# Patient Record
Sex: MALE | Race: BLACK OR AFRICAN AMERICAN | Employment: OTHER | URBAN - METROPOLITAN AREA
[De-identification: names, ages, dates, MRNs, and addresses within clinical notes are randomized per-mention and may not be internally consistent; named-entity substitution may affect disease eponyms.]

---

## 2017-11-09 PROCEDURE — 86787 VARICELLA-ZOSTER ANTIBODY: CPT | Performed by: FAMILY MEDICINE

## 2017-11-09 PROCEDURE — 81003 URINALYSIS AUTO W/O SCOPE: CPT | Performed by: FAMILY MEDICINE

## 2017-11-09 PROCEDURE — 87389 HIV-1 AG W/HIV-1&-2 AB AG IA: CPT | Performed by: FAMILY MEDICINE

## 2017-11-09 PROCEDURE — 36415 COLL VENOUS BLD VENIPUNCTURE: CPT | Performed by: FAMILY MEDICINE

## 2018-01-10 PROCEDURE — 88305 TISSUE EXAM BY PATHOLOGIST: CPT | Performed by: INTERNAL MEDICINE

## 2019-07-16 ENCOUNTER — HOSPITAL ENCOUNTER (INPATIENT)
Facility: HOSPITAL | Age: 64
LOS: 1 days | Discharge: HOME | DRG: 310 | End: 2019-07-17
Attending: EMERGENCY MEDICINE | Admitting: INTERNAL MEDICINE
Payer: COMMERCIAL

## 2019-07-16 ENCOUNTER — APPOINTMENT (OUTPATIENT)
Dept: GENERAL RADIOLOGY | Facility: HOSPITAL | Age: 64
DRG: 310 | End: 2019-07-16
Payer: COMMERCIAL

## 2019-07-16 DIAGNOSIS — I47.2 NSVT (NONSUSTAINED VENTRICULAR TACHYCARDIA) (HCC): Primary | ICD-10-CM

## 2019-07-16 PROBLEM — I47.29 NSVT (NONSUSTAINED VENTRICULAR TACHYCARDIA) (HCC): Status: ACTIVE | Noted: 2019-07-16

## 2019-07-16 LAB
ALBUMIN SERPL-MCNC: 3.8 G/DL (ref 3.4–5)
ALBUMIN/GLOB SERPL: 0.9 {RATIO} (ref 1–2)
ALP LIVER SERPL-CCNC: 47 U/L (ref 45–117)
ALT SERPL-CCNC: 34 U/L (ref 16–61)
ANION GAP SERPL CALC-SCNC: 6 MMOL/L (ref 0–18)
AST SERPL-CCNC: 22 U/L (ref 15–37)
ATRIAL RATE: 68 BPM
BASOPHILS # BLD AUTO: 0.03 X10(3) UL (ref 0–0.2)
BASOPHILS NFR BLD AUTO: 0.6 %
BILIRUB SERPL-MCNC: 0.6 MG/DL (ref 0.1–2)
BUN BLD-MCNC: 15 MG/DL (ref 7–18)
BUN/CREAT SERPL: 11.1 (ref 10–20)
CALCIUM BLD-MCNC: 8.8 MG/DL (ref 8.5–10.1)
CHLORIDE SERPL-SCNC: 104 MMOL/L (ref 98–112)
CO2 SERPL-SCNC: 24 MMOL/L (ref 21–32)
CREAT BLD-MCNC: 1.35 MG/DL (ref 0.7–1.3)
DEPRECATED RDW RBC AUTO: 42.3 FL (ref 35.1–46.3)
EOSINOPHIL # BLD AUTO: 0.2 X10(3) UL (ref 0–0.7)
EOSINOPHIL NFR BLD AUTO: 3.8 %
ERYTHROCYTE [DISTWIDTH] IN BLOOD BY AUTOMATED COUNT: 19.2 % (ref 11–15)
GLOBULIN PLAS-MCNC: 4.1 G/DL (ref 2.8–4.4)
GLUCOSE BLD-MCNC: 97 MG/DL (ref 70–99)
HAV IGM SER QL: 2 MG/DL (ref 1.6–2.6)
HCT VFR BLD AUTO: 49.9 % (ref 39–53)
HGB BLD-MCNC: 15.8 G/DL (ref 13–17.5)
IMM GRANULOCYTES # BLD AUTO: 0.01 X10(3) UL (ref 0–1)
IMM GRANULOCYTES NFR BLD: 0.2 %
LYMPHOCYTES # BLD AUTO: 2.19 X10(3) UL (ref 1–4)
LYMPHOCYTES NFR BLD AUTO: 41.4 %
M PROTEIN MFR SERPL ELPH: 7.9 G/DL (ref 6.4–8.2)
MCH RBC QN AUTO: 22.7 PG (ref 26–34)
MCHC RBC AUTO-ENTMCNC: 31.7 G/DL (ref 31–37)
MCV RBC AUTO: 71.6 FL (ref 80–100)
MONOCYTES # BLD AUTO: 0.77 X10(3) UL (ref 0.1–1)
MONOCYTES NFR BLD AUTO: 14.6 %
NEUTROPHILS # BLD AUTO: 2.09 X10 (3) UL (ref 1.5–7.7)
NEUTROPHILS # BLD AUTO: 2.09 X10(3) UL (ref 1.5–7.7)
NEUTROPHILS NFR BLD AUTO: 39.4 %
OSMOLALITY SERPL CALC.SUM OF ELEC: 279 MOSM/KG (ref 275–295)
P AXIS: 28 DEGREES
P-R INTERVAL: 204 MS
PLATELET # BLD AUTO: 78 10(3)UL (ref 150–450)
POTASSIUM SERPL-SCNC: 3.6 MMOL/L (ref 3.5–5.1)
Q-T INTERVAL: 418 MS
QRS DURATION: 94 MS
QTC CALCULATION (BEZET): 444 MS
R AXIS: 43 DEGREES
RBC # BLD AUTO: 6.97 X10(6)UL (ref 4.3–5.7)
SODIUM SERPL-SCNC: 134 MMOL/L (ref 136–145)
T AXIS: 68 DEGREES
TROPONIN I SERPL-MCNC: <0.045 NG/ML (ref ?–0.04)
VENTRICULAR RATE: 68 BPM
WBC # BLD AUTO: 5.3 X10(3) UL (ref 4–11)

## 2019-07-16 PROCEDURE — 85025 COMPLETE CBC W/AUTO DIFF WBC: CPT

## 2019-07-16 PROCEDURE — 94660 CPAP INITIATION&MGMT: CPT

## 2019-07-16 PROCEDURE — 99285 EMERGENCY DEPT VISIT HI MDM: CPT | Performed by: EMERGENCY MEDICINE

## 2019-07-16 PROCEDURE — 84484 ASSAY OF TROPONIN QUANT: CPT

## 2019-07-16 PROCEDURE — 83735 ASSAY OF MAGNESIUM: CPT | Performed by: INTERNAL MEDICINE

## 2019-07-16 PROCEDURE — 80053 COMPREHEN METABOLIC PANEL: CPT

## 2019-07-16 PROCEDURE — 36415 COLL VENOUS BLD VENIPUNCTURE: CPT | Performed by: EMERGENCY MEDICINE

## 2019-07-16 PROCEDURE — 93010 ELECTROCARDIOGRAM REPORT: CPT | Performed by: EMERGENCY MEDICINE

## 2019-07-16 PROCEDURE — 93005 ELECTROCARDIOGRAM TRACING: CPT

## 2019-07-16 PROCEDURE — 71045 X-RAY EXAM CHEST 1 VIEW: CPT

## 2019-07-16 RX ORDER — PRAVASTATIN SODIUM 20 MG
20 TABLET ORAL NIGHTLY
Status: DISCONTINUED | OUTPATIENT
Start: 2019-07-16 | End: 2019-07-17

## 2019-07-16 RX ORDER — POTASSIUM CHLORIDE 20 MEQ/1
40 TABLET, EXTENDED RELEASE ORAL EVERY 4 HOURS
Status: COMPLETED | OUTPATIENT
Start: 2019-07-16 | End: 2019-07-16

## 2019-07-16 RX ORDER — CARVEDILOL 12.5 MG/1
25 TABLET ORAL 2 TIMES DAILY WITH MEALS
Status: DISCONTINUED | OUTPATIENT
Start: 2019-07-16 | End: 2019-07-17

## 2019-07-16 RX ORDER — HYDROCHLOROTHIAZIDE 25 MG/1
25 TABLET ORAL
Status: DISCONTINUED | OUTPATIENT
Start: 2019-07-16 | End: 2019-07-17

## 2019-07-16 RX ORDER — ACETAMINOPHEN 325 MG/1
650 TABLET ORAL EVERY 6 HOURS PRN
Status: DISCONTINUED | OUTPATIENT
Start: 2019-07-16 | End: 2019-07-17

## 2019-07-16 RX ORDER — DILTIAZEM HYDROCHLORIDE 60 MG/1
60 TABLET, FILM COATED ORAL EVERY 6 HOURS SCHEDULED
Status: DISCONTINUED | OUTPATIENT
Start: 2019-07-16 | End: 2019-07-17

## 2019-07-16 NOTE — ED NOTES
Update given to [facility] regarding the blood pressure and the runs of vtach patient is asymptomatic MD aware no change in treatment plan

## 2019-07-16 NOTE — CONSULTS
[facility] Cardiology  Consultation Note      Arabella Rey.  Patient Status:  Inpatient    1955 MRN [id number]   [facility] 2NE-A Attending Brenda Krishnan MD   Hosp Day # 0 PCP Ren Bruce MD     Reason for cons Sickle cell trait ([facility] 3/14/2007   • Thrombopenia ([facility] 12/14/2006   • Unspecified essential hypertension    • Unspecified sleep apnea     PSG at [place] 2/23/2008 AHI 66   • Vitamin D deficiency 5/10   • Vitamin D deficiency 3/14/2010       Past Surgical pulse 70, temperature 98.6 °F (37 °C), temperature source Temporal, resp. rate 17, height 6' 5\" (1.956 m), weight 299 lb 13.2 oz (136 kg), SpO2 99 %.   Temp (24hrs), Av.6 °F (37 °C), Min:98.6 °F (37 °C), Max:98.6 °F (37 °C)    Wt Readings from Last 3 E

## 2019-07-16 NOTE — ED PROVIDER NOTES
Patient Seen in: [facility] Emergency Department    History   Patient presents with:  Abnormal Result (metabolic, cardiac)    Stated Complaint: abnormal EKG     HPI    Patient is a 60-year-old male sent here from stress test after he was found to ha Alcohol/week: 0.0 oz      Frequency: 2-4 times a month      Drinks per session: 1 or 2      Binge frequency: Never      Comment: ABOUT 3-5  DRINKS/WK. DENIES BINGE since ABOUT 2000. Drug use: No      Comment: NO MARIJUANA SINCE YOUTH.       Review of S 19.2 (*)     All other components within normal limits   TROPONIN I - Normal   CBC WITH DIFFERENTIAL WITH PLATELET    Narrative: The following orders were created for panel order CBC WITH DIFFERENTIAL WITH PLATELET.   Procedure

## 2019-07-16 NOTE — ED INITIAL ASSESSMENT (HPI)
Pt here after EKG at clinic showed runs of vtach. Patient denies any chest pain or JAMEY. States he was receiving a physical exam and was to have echo stress test completed today. Reports only abnormal symptoms is a few dizzy spells last month.  Denies any hi

## 2019-07-17 ENCOUNTER — APPOINTMENT (OUTPATIENT)
Dept: CT IMAGING | Facility: HOSPITAL | Age: 64
DRG: 310 | End: 2019-07-17
Attending: INTERNAL MEDICINE
Payer: COMMERCIAL

## 2019-07-17 ENCOUNTER — APPOINTMENT (OUTPATIENT)
Dept: CV DIAGNOSTICS | Facility: HOSPITAL | Age: 64
DRG: 310 | End: 2019-07-17
Attending: INTERNAL MEDICINE
Payer: COMMERCIAL

## 2019-07-17 VITALS
SYSTOLIC BLOOD PRESSURE: 136 MMHG | HEART RATE: 55 BPM | BODY MASS INDEX: 35.4 KG/M2 | WEIGHT: 299.81 LBS | HEIGHT: 77 IN | RESPIRATION RATE: 20 BRPM | OXYGEN SATURATION: 93 % | TEMPERATURE: 98 F | DIASTOLIC BLOOD PRESSURE: 75 MMHG

## 2019-07-17 LAB
POTASSIUM SERPL-SCNC: 3.5 MMOL/L (ref 3.5–5.1)
TSI SER-ACNC: 1.72 MIU/ML (ref 0.36–3.74)

## 2019-07-17 PROCEDURE — 93306 TTE W/DOPPLER COMPLETE: CPT | Performed by: INTERNAL MEDICINE

## 2019-07-17 PROCEDURE — 84443 ASSAY THYROID STIM HORMONE: CPT | Performed by: HOSPITALIST

## 2019-07-17 PROCEDURE — 75574 CT ANGIO HRT W/3D IMAGE: CPT | Performed by: INTERNAL MEDICINE

## 2019-07-17 PROCEDURE — 84132 ASSAY OF SERUM POTASSIUM: CPT | Performed by: HOSPITALIST

## 2019-07-17 RX ORDER — NITROGLYCERIN 0.4 MG/1
TABLET SUBLINGUAL
Status: COMPLETED
Start: 2019-07-17 | End: 2019-07-17

## 2019-07-17 RX ORDER — NITROGLYCERIN 0.4 MG/1
0.4 TABLET SUBLINGUAL ONCE
Status: COMPLETED | OUTPATIENT
Start: 2019-07-17 | End: 2019-07-17

## 2019-07-17 RX ORDER — DILTIAZEM HYDROCHLORIDE 120 MG/1
120 CAPSULE, COATED, EXTENDED RELEASE ORAL DAILY
Qty: 30 CAPSULE | Refills: 1 | Status: SHIPPED | OUTPATIENT
Start: 2019-07-17 | End: 2019-09-03

## 2019-07-17 RX ORDER — POTASSIUM CHLORIDE 20 MEQ/1
40 TABLET, EXTENDED RELEASE ORAL EVERY 4 HOURS
Status: COMPLETED | OUTPATIENT
Start: 2019-07-17 | End: 2019-07-17

## 2019-07-17 RX ORDER — DILTIAZEM HYDROCHLORIDE 120 MG/1
120 CAPSULE, EXTENDED RELEASE ORAL DAILY
Status: DISCONTINUED | OUTPATIENT
Start: 2019-07-17 | End: 2019-07-17

## 2019-07-17 NOTE — PROGRESS NOTES
[facility] [address] [place] [address] Cardiology  Progress Note    Massey Marshall.  Patient Status:  Inpatient    1955 MRN [id number]   [facility] 2NE-A Attending Sarah Oakley MD   Hosp Day # 1 PCP Nicki Garcia MD     ADDENDUM:  The gerardo kg)      General: Awake and alert; in no acute distress  Cardiac: Regular rate and regular rhythm; no murmurs/rubs/gallops are appreciated  Lungs: Clear to auscultation bilaterally; no accessory muscle use  Abdomen: Soft, non-tender; bowel sounds are normo

## 2019-07-17 NOTE — PLAN OF CARE
Problem: Patient/Family Goals  Goal: Patient/Family Long Term Goal  Description  Patient's Long Term Goal: discharge    Interventions:    - See additional Care Plan goals for specific interventions  7/17/2019 1651 by Flakita Ray RN  Outcome: Antonio Olivares

## 2019-07-17 NOTE — PLAN OF CARE
Patient pleasant a/o.vss. Instructed patient on testing to be performed today. 18 g IV needed and placed to perform CTA corns this AM. All AM meds given this am and tolerating well. K replaced this am as well.  Patient denies sob and chest pain and is not

## 2019-07-17 NOTE — PLAN OF CARE
7/17/2019    Pt A&Ox4, RA w/ cpap use at night. NSR w/ frequent pvc's-  aware. Continent of B and B. Up adlib. Denies any pain or sob.  Pt asleep at this time    POC: NPO, labs in am, Coronary cta w/ no protocol, Echo        Problem: CARDIOVASCULAR - AD

## 2019-07-17 NOTE — RESPIRATORY THERAPY NOTE
AYDEE : EQUIPMENT USE: DAILY SUMMARY                                            SET MODE: AUTO CPAP WITH CFLEX                                          USAGE IN HOURS:7:24                                          90%

## 2019-07-17 NOTE — CONSULTS
[facility] Electrophysiology Consult      Jax Huddleston.  Patient Status:  Inpatient    1955 MRN [id number]   [facility] 2NE-A Attending Marthenia Goodell, MD   [facility] Day # 1 PCP MD Jax Yang BY MOUTH  DAILY Disp: 90 tablet Rfl: 0   CARVEDILOL 25 MG Oral Tab TAKE 1 TABLET BY MOUTH  TWICE A DAY WITH MEALS Disp: 180 tablet Rfl: 0   Pravastatin Sodium 20 MG Oral Tab TAKE 1 TABLET BY MOUTH ONCE EVERY EVENING Disp: 90 tablet Rfl: 1   TADALAFIL 20 MG thyromegaly  RESPIRATORY: clear to auscultation  CARDIOVASCULAR: S1, S2 normal, RRR; no S3, no S4; no click; no murmur  ABDOMEN: normal active BS, soft, nondistended; nontender  EXTREMITIES: no cyanosis, clubbing or edema, peripheral pulses intact  NEURO:

## 2019-07-17 NOTE — PAYOR COMM NOTE
--------------  ADMISSION REVIEW     Payor: [address] [employer]  Subscriber #:  [id number]  Authorization Number: Pend    Admit date: 7/16/19  Admit time: 1643       Admitting Physician: Torri Zayas MD  Attending Physician:  Feliciano Padilla otherwise normal sinus rhythm without ST changes T wave inversions. EKG from stress test reviewed, several runs of NSVT. MDM       Per Dr. Viridiana Michelle from [facility] cardiology:   Admit to telemetry. Ischemic work-up tomorrow, possibly get a CTA.   EP t CONSULT    He went to see his PCP and told him things looked good but that he hsould get the stress test that had been ordered earlier this year.  It seems to have been ordered as a screening test.     He came for the stress test and was noted to have very

## 2019-07-17 NOTE — IMAGING NOTE
Spoke with [facility] RN instructed he can eat breakfast, drink plenty of water, hold caffeine (including decaf & chocolate) x 12 hours, take all meds (including beta blockers), pt will need an 18g in [facility] for IV access for the CTA Gated Coronary study, s

## 2019-07-18 NOTE — DISCHARGE SUMMARY
General Medicine Discharge Summary     Patient ID:  Aparna Lopez.  59year old  4/21/1955    Admit date: 7/16/2019    Discharge date and time:  7/17/19    Attending Physician: Mireya att. prem zacarias Procedures:        Patient instructions:      Discharge Medication List as of 7/17/2019  5:01 PM    START taking these medications    dilTIAZem HCl ER Coated Beads 120 MG Oral Capsule SR 24 Hr  Take 1 capsule (120 mg total) by mouth daily. , Normal, Disp-30

## 2019-07-25 NOTE — PAYOR COMM NOTE
--------------  DISCHARGE REVIEW    Payor: [facility]  Subscriber #:  [id number]  Authorization Number: Pend    Admit date: 7/16/19  Admit time:  9742  Discharge Date: 7/17/2019  6:12 PM     Admitting Physician: Roger Hurt MD  [facility]

## 2020-06-19 PROBLEM — I42.9 CARDIOMYOPATHY (HCC): Status: ACTIVE | Noted: 2020-06-19

## 2020-12-04 VITALS — BODY MASS INDEX: 34.24 KG/M2 | HEIGHT: 77 IN | WEIGHT: 290 LBS

## 2020-12-06 ENCOUNTER — LAB ENCOUNTER (OUTPATIENT)
Dept: LAB | Facility: HOSPITAL | Age: 65
End: 2020-12-06
Attending: INTERNAL MEDICINE
Payer: MEDICARE

## 2020-12-06 DIAGNOSIS — I48.91 A-FIB (HCC): ICD-10-CM

## 2020-12-08 ENCOUNTER — HOSPITAL ENCOUNTER (OUTPATIENT)
Dept: INTERVENTIONAL RADIOLOGY/VASCULAR | Facility: HOSPITAL | Age: 65
Discharge: HOME | End: 2020-12-08
Attending: INTERNAL MEDICINE | Admitting: INTERNAL MEDICINE
Payer: MEDICARE

## 2020-12-08 DIAGNOSIS — I48.91 A-FIB (HCC): Primary | ICD-10-CM

## 2020-12-08 DIAGNOSIS — I48.91 ATRIAL FIBRILLATION, UNSPECIFIED TYPE (HCC): ICD-10-CM

## 2020-12-08 PROCEDURE — 94660 CPAP INITIATION&MGMT: CPT

## 2020-12-08 RX ORDER — SODIUM CHLORIDE 9 MG/ML
INJECTION, SOLUTION INTRAVENOUS CONTINUOUS
Status: DISCONTINUED | OUTPATIENT
Start: 2020-12-08 | End: 2020-12-09

## 2020-12-08 NOTE — PROGRESS NOTES
Per Dr. Verlena Apley, DCCV cancelled d/t patients HR. PT verbalized understanding. PT DC home with son.

## 2020-12-08 NOTE — H&P
72year old male     He was at THE [facility] 7/2019:  1m ago he had 2 episodes of transient lightheadedness lasting 10 seconds when he got out of his car.   He went to see his PCP and told him things looked good but that he should get the stress test that had been 180 tablet, Rfl: 3  •  dilTIAZem HCl ER Coated Beads 120 MG Oral Capsule SR 24 Hr, Take 1 capsule (120 mg total) by mouth 2 (two) times daily. , Disp: 180 capsule, Rfl: 2     Physical:  /88   Pulse 59   Ht 6' 5\" (1.956 m)   Wt 294 lb (133.4 kg)   BMI

## 2020-12-15 ENCOUNTER — LAB ENCOUNTER (OUTPATIENT)
Dept: LAB | Facility: HOSPITAL | Age: 65
End: 2020-12-15
Attending: INTERNAL MEDICINE
Payer: MEDICARE

## 2020-12-15 DIAGNOSIS — I48.91 A-FIB (HCC): ICD-10-CM

## 2020-12-17 ENCOUNTER — TELEPHONE (OUTPATIENT)
Dept: HEMATOLOGY/ONCOLOGY | Facility: HOSPITAL | Age: 65
End: 2020-12-17

## 2020-12-17 NOTE — TELEPHONE ENCOUNTER
Alejandro called to schedule a consult with  referred by  for low platelets. He is requesting [place]. Is there a sooner availability I could offer?

## 2020-12-18 ENCOUNTER — HOSPITAL ENCOUNTER (OUTPATIENT)
Dept: INTERVENTIONAL RADIOLOGY/VASCULAR | Facility: HOSPITAL | Age: 65
Discharge: HOME | End: 2020-12-18
Attending: INTERNAL MEDICINE | Admitting: INTERNAL MEDICINE
Payer: MEDICARE

## 2020-12-18 VITALS
WEIGHT: 290 LBS | HEIGHT: 77 IN | OXYGEN SATURATION: 100 % | HEART RATE: 57 BPM | RESPIRATION RATE: 18 BRPM | SYSTOLIC BLOOD PRESSURE: 149 MMHG | BODY MASS INDEX: 34.24 KG/M2 | DIASTOLIC BLOOD PRESSURE: 88 MMHG

## 2020-12-18 DIAGNOSIS — I48.91 A-FIB (HCC): Primary | ICD-10-CM

## 2020-12-18 DIAGNOSIS — I48.91 ATRIAL FIBRILLATION, UNSPECIFIED TYPE (HCC): ICD-10-CM

## 2020-12-18 RX ORDER — SODIUM CHLORIDE 9 MG/ML
INJECTION, SOLUTION INTRAVENOUS CONTINUOUS
Status: DISCONTINUED | OUTPATIENT
Start: 2020-12-18 | End: 2020-12-18

## 2020-12-18 NOTE — H&P
72year old male     He was at THE [facility] 7/2019:  1m ago he had 2 episodes of transient lightheadedness lasting 10 seconds when he got out of his car.   He went to see his PCP and told him things looked good but that he should get the stress test that had been 120 MG Oral Capsule SR 24 Hr, Take 1 capsule (120 mg total) by mouth 2 (two) times daily. , Disp: 180 capsule, Rfl: 2     Physical:  /88   Pulse 59   Ht 6' 5\" (1.956 m)   Wt 294 lb (133.4 kg)   BMI 34.86 kg/m²   GENERAL: well developed, well nourishe

## 2020-12-18 NOTE — PROGRESS NOTES
dR Rodriguez AT BEDSIDE REVIEWING PATIENTS EKG. nOT IN AFIB, NO CARDIOVERSION NEED AT THIS TIME PER DR RODRIGUEZ. OK TO DISCHARGE HOME.  INSTRUCTIONS TO TO  HOME MOITOR IN OFF THIS WEEK, SEE DR RODRIGUEZ IN 1 MONTH, AND NO CHANGE IN MEDICATIONS

## 2020-12-24 PROBLEM — I48.91 ATRIAL FIBRILLATION (HCC): Status: ACTIVE | Noted: 2020-12-24

## 2020-12-24 PROBLEM — D68.69 SECONDARY HYPERCOAGULABLE STATE (HCC): Status: ACTIVE | Noted: 2020-12-24

## 2021-01-04 ENCOUNTER — OFFICE VISIT (OUTPATIENT)
Dept: HEMATOLOGY/ONCOLOGY | Facility: HOSPITAL | Age: 66
End: 2021-01-04
Attending: INTERNAL MEDICINE
Payer: MEDICARE

## 2021-01-04 VITALS
RESPIRATION RATE: 16 BRPM | OXYGEN SATURATION: 96 % | TEMPERATURE: 97 F | SYSTOLIC BLOOD PRESSURE: 154 MMHG | HEIGHT: 77.01 IN | DIASTOLIC BLOOD PRESSURE: 90 MMHG | BODY MASS INDEX: 36.07 KG/M2 | HEART RATE: 60 BPM | WEIGHT: 305.5 LBS

## 2021-01-04 DIAGNOSIS — D58.2 OTHER HEMOGLOBINOPATHIES (HCC): ICD-10-CM

## 2021-01-04 DIAGNOSIS — D69.6 THROMBOCYTOPENIA (HCC): Primary | ICD-10-CM

## 2021-01-04 PROCEDURE — 99205 OFFICE O/P NEW HI 60 MIN: CPT | Performed by: INTERNAL MEDICINE

## 2021-01-04 NOTE — PATIENT INSTRUCTIONS
Your platelet count is mildly low, and has been that way since at least 2007.  This is likely a normal value for you, as the rest of your blood cells are normal. It is possible that it represents chronic, mild, immune suppression of your platelet count, but

## 2021-01-04 NOTE — CONSULTS
[facility] Center Report of Consultation    Patient Name: Mei Gamez. YOB: 1955   Medical Record Number: [id number]   CSN: [id number]   Consulting Physician: iNno Palmer M.D. Referring Physician: No ref.  provider found diverticulosis, multiple polyps (7 adenomas from 3-10 mm in ascending and transverse colon). repeat 3 yrs   • COLONOSCOPY     • OTHER SURGICAL HISTORY      DENTAL   • OTHER SURGICAL HISTORY      EXPLORATORY LAP.  AT [address] [place] [address] Drive Sexual activity: Not Currently        Partners: Female    Lifestyle      Physical activity        Days per week: Not on file        Minutes per session: Not on file      Stress: Not on file    Relationships      Social connections        Talks on phone: No (25 mg total) by mouth 2 (two) times daily. , Disp: 180 tablet, Rfl: 3    Allergies:  No Known Allergies     Review of Systems:    Constitutional Normal - No fevers, chills, night sweats, excessive fatigue or weight loss.    Eyes Normal - No significant visu cervical, supraclavicular, axillary or inguinal area. Respiratory Normal - Lungs are clear to auscultation without rhonchi or wheezing. Cardiovascular Normal - Regular rate and rhythm of heart without murmurs, gallops or rubs.    Abdomen Normal - Non-te blood counts. It likely accounts for his mild microcytosis. We discussed the genetic nature of this finding.  I recommend testing of his children, as this can be passed on to future generations, and if a child inherits this finding, along with another hemog

## 2021-02-02 DIAGNOSIS — Z23 NEED FOR VACCINATION: ICD-10-CM

## 2021-02-05 ENCOUNTER — IMMUNIZATION (OUTPATIENT)
Dept: LAB | Age: 66
End: 2021-02-05
Attending: HOSPITALIST
Payer: MEDICARE

## 2021-02-05 DIAGNOSIS — Z23 NEED FOR VACCINATION: Primary | ICD-10-CM

## 2021-02-05 PROCEDURE — 0001A SARSCOV2 VAC 30MCG/0.3ML IM: CPT

## 2021-02-26 ENCOUNTER — IMMUNIZATION (OUTPATIENT)
Dept: LAB | Age: 66
End: 2021-02-26
Attending: HOSPITALIST
Payer: MEDICARE

## 2021-02-26 DIAGNOSIS — Z23 NEED FOR VACCINATION: Primary | ICD-10-CM

## 2021-02-26 PROCEDURE — 0002A SARSCOV2 VAC 30MCG/0.3ML IM: CPT

## 2021-05-17 PROBLEM — I48.19 PERSISTENT ATRIAL FIBRILLATION (HCC): Status: ACTIVE | Noted: 2021-05-17

## 2021-07-15 PROBLEM — N52.9 ERECTILE DYSFUNCTION, UNSPECIFIED ERECTILE DYSFUNCTION TYPE: Status: ACTIVE | Noted: 2021-07-15

## 2022-05-25 ENCOUNTER — EMPLOYEE HEALTH (OUTPATIENT)
Dept: OTHER | Facility: HOSPITAL | Age: 67
End: 2022-05-25
Attending: PREVENTIVE MEDICINE

## 2022-05-25 DIAGNOSIS — Z00.00 WELLNESS EXAMINATION: Primary | ICD-10-CM

## 2022-05-25 LAB
RUBV IGG SER QL: POSITIVE
RUBV IGG SER-ACNC: 88.4 IU/ML (ref 10–?)

## 2022-05-25 PROCEDURE — 86480 TB TEST CELL IMMUN MEASURE: CPT

## 2022-05-25 PROCEDURE — 86765 RUBEOLA ANTIBODY: CPT

## 2022-05-25 PROCEDURE — 86787 VARICELLA-ZOSTER ANTIBODY: CPT

## 2022-05-25 PROCEDURE — 86735 MUMPS ANTIBODY: CPT

## 2022-05-26 LAB
M TB IFN-G CD4+ T-CELLS BLD-ACNC: 0.14 IU/ML
M TB TUBERC IFN-G BLD QL: NEGATIVE
M TB TUBERC IGNF/MITOGEN IGNF CONTROL: >10 IU/ML
QFT TB1 AG MINUS NIL: -0.06 IU/ML
QFT TB2 AG MINUS NIL: -0.07 IU/ML

## 2022-05-27 LAB
MEV IGG SER-ACNC: >300 AU/ML (ref 16.5–?)
MUV IGG SER IA-ACNC: 146 AU/ML (ref 11–?)
VZV IGG SER IA-ACNC: 1470 (ref 165–?)

## 2025-03-10 RX ORDER — RUTIN/HESP/BIOFLAV/C/HERBAL196 40-25-50MG
1 TABLET ORAL DAILY
COMMUNITY

## 2025-03-24 ENCOUNTER — LABORATORY ENCOUNTER (OUTPATIENT)
Dept: LAB | Facility: HOSPITAL | Age: 70
End: 2025-03-24
Attending: ORTHOPAEDIC SURGERY
Payer: MEDICARE

## 2025-03-24 DIAGNOSIS — Z01.818 PRE-OP TESTING: ICD-10-CM

## 2025-03-24 LAB
ANION GAP SERPL CALC-SCNC: 8 MMOL/L (ref 0–18)
ANTIBODY SCREEN: NEGATIVE
ATRIAL RATE: 53 BPM
BUN BLD-MCNC: 14 MG/DL (ref 9–23)
CALCIUM BLD-MCNC: 9.5 MG/DL (ref 8.7–10.6)
CHLORIDE SERPL-SCNC: 100 MMOL/L (ref 98–112)
CO2 SERPL-SCNC: 31 MMOL/L (ref 21–32)
CREAT BLD-MCNC: 1.21 MG/DL
EGFRCR SERPLBLD CKD-EPI 2021: 65 ML/MIN/1.73M2 (ref 60–?)
ERYTHROCYTE [DISTWIDTH] IN BLOOD BY AUTOMATED COUNT: 17.7 %
FASTING STATUS PATIENT QL REPORTED: YES
GLUCOSE BLD-MCNC: 100 MG/DL (ref 70–99)
HCT VFR BLD AUTO: 50.7 %
HGB BLD-MCNC: 16 G/DL
MCH RBC QN AUTO: 22.4 PG (ref 26–34)
MCHC RBC AUTO-ENTMCNC: 31.6 G/DL (ref 31–37)
MCV RBC AUTO: 70.9 FL
OSMOLALITY SERPL CALC.SUM OF ELEC: 289 MOSM/KG (ref 275–295)
PLATELET # BLD AUTO: 84 10(3)UL (ref 150–450)
PLATELETS.RETICULATED NFR BLD AUTO: 21.2 % (ref 0–7)
POTASSIUM SERPL-SCNC: 3.8 MMOL/L (ref 3.5–5.1)
Q-T INTERVAL: 476 MS
QRS DURATION: 102 MS
QTC CALCULATION (BEZET): 451 MS
R AXIS: 60 DEGREES
RBC # BLD AUTO: 7.15 X10(6)UL
RH BLOOD TYPE: POSITIVE
SODIUM SERPL-SCNC: 139 MMOL/L (ref 136–145)
T AXIS: 31 DEGREES
VENTRICULAR RATE: 54 BPM
WBC # BLD AUTO: 5.3 X10(3) UL (ref 4–11)

## 2025-03-24 PROCEDURE — 86850 RBC ANTIBODY SCREEN: CPT

## 2025-03-24 PROCEDURE — 93005 ELECTROCARDIOGRAM TRACING: CPT

## 2025-03-24 PROCEDURE — 36415 COLL VENOUS BLD VENIPUNCTURE: CPT

## 2025-03-24 PROCEDURE — 87081 CULTURE SCREEN ONLY: CPT

## 2025-03-24 PROCEDURE — 86901 BLOOD TYPING SEROLOGIC RH(D): CPT

## 2025-03-24 PROCEDURE — 93010 ELECTROCARDIOGRAM REPORT: CPT | Performed by: INTERNAL MEDICINE

## 2025-03-24 PROCEDURE — 86900 BLOOD TYPING SEROLOGIC ABO: CPT

## 2025-03-24 PROCEDURE — 85027 COMPLETE CBC AUTOMATED: CPT

## 2025-03-24 PROCEDURE — 80048 BASIC METABOLIC PNL TOTAL CA: CPT

## 2025-03-25 NOTE — H&P (VIEW-ONLY)
Patient ID: Russ Wheeler Jr.     Chief Complaint:    Left knee pain    HPI:  Russ Wheeler Jr. is a 69 year old male who presents today for evaluation of their left knee pain. Previously seen by Dr. Kessler who performed cortisone injection X3. Last cortisone injection (12/11/24) only provided 1-2 weeks of relief. Patient states pain started >2 yrs ago. Pain is on and off and described as sharp, is worse with weight bearing. Pain is located in the medial portion. Factors that aggravate symptoms include weight bearing, running, walking. Patient denies numbness, tingling, or radicular symptoms. Pain has continued and here to discuss surgical management.     Past medical history is pertinent for the following:  Negative history of blood clots ( DVT, PE, Stroke), Positive history of sleep apnea (CPAP compliant), Negative history of dental infection or joint infection, Negative history of UTI.      Social History    Socioeconomic History      Marital status:       Spouse name: WIDOWERED (IRMA SHANTAL      Number of children: 3      Years of education: MASTERS      Highest education level: Not on file    Occupational History      Occupation: RETIRED      Occupation: VOLUNTEER    Tobacco Use      Smoking status: Never      Smokeless tobacco: Never    Vaping Use      Vaping status: Never Used    Substance and Sexual Activity      Alcohol use: Yes        Alcohol/week: 0.0 standard drinks of alcohol        Comment: ABOUT 3-5  DRINKS/WK. DENIES BINGE since ABOUT 2000.       Drug use: No        Comment: NO MARIJUANA SINCE YOUTH.      Sexual activity: Not Currently        Partners: Female    Other Topics      Concerns:         Service: No        Blood Transfusions: No        Caffeine Concern: No          2-3 CUPS COFFEE/D         Occupational Exposure: Not Asked        Hobby Hazards: Not Asked        Sleep Concern: Not Asked        Stress Concern: Not Asked        Weight Concern: Yes        Special Diet:  Yes        Back Care: Not Asked        Exercise: Yes          MILD        Bike Helmet: Yes        Seat Belt: Yes        Self-Exams: Not Asked    Social History Narrative      [facility]    Social Determinants of Health  Financial Resource Strain: Not on file  Food Insecurity: No Food Insecurity (11/26/2018)      Hunger Vital Sign          Worried About Running Out of Food in the Last Year: Never true          Ran Out of Food in the Last Year: Never true  Transportation Needs: No Transportation Needs (11/26/2018)      PRAPARE - Transportation          Lack of Transportation (Medical): No          Lack of Transportation (Non-Medical): No  Physical Activity: Not on file  Stress: Not on file  Social Connections: Not on file  Intimate Partner Violence: Not on file  Housing Stability: Not on file  Past Medical History:   Diagnosis Date   • Arrhythmia    • Diverticulosis of large intestine     2006   • High blood pressure    • High cholesterol    • HTN, goal below 140/90 12/14/2006   • Hyperlipidemia LDL goal < 130 12/14/2006   • Obesity (BMI 30-39.9) 12/14/2006   • Obesity, unspecified    • Other and unspecified hyperlipidemia    • Paroxysmal atrial fibrillation (HCC)    • Thrombopenia (HCC) 12/14/2006   • Unspecified essential hypertension    • Unspecified sleep apnea     PSG at [place] 2/23/2008 AHI 66   • Vitamin D deficiency 05/2010   • Vitamin D deficiency 03/14/2010     Family History   Problem Relation Age of Onset   • Heart Disorder Father         CAD AT 69   • Diabetes Father    • Psychiatric Father 80        1)FORGETFUL  2)GRIEF REACTION AFTER WIFE'S DEATH   • Neurological Disorder Father         MILD PARKINSON'S   • Other (Other) Father    • Hypertension Mother    • Other (Other) Mother         OSTEOPORSIS   • Hypertension Son    • Alcohol and Other Disorders Associated Son         1) ETOHIC 2) NARCOTIC ADDICT   • Obesity Son    • Stroke Maternal Grandmother    • Other (Other)  Maternal Grandmother         CVA 70'S   • Obesity Son    • Hypertension Son         ?   • Obesity Sister    • Other (AYDEE ON CPAP) Sister    • Cancer Other         NONE       ROS:      All other pertinent positives and negatives as noted above in HPI.     Physical Exam:     Vital Signs:  There were no vitals taken for this visit.  Estimated body mass index is 33.8 kg/m² as calculated from the following:    Height as of 3/24/25: 6' 5\" (1.956 m).    Weight as of 3/24/25: 285 lb (129.3 kg).      Musculoskeletal:    Left Knee:  Painful gait with a subtle limp  No muscle atrophy, erythema, ecchymosis, or gross deformity noted  Mild knee effusion  + medial>lateral joint line tenderness  Active range of motion 0-120  5/5 strength flexion and extension  The knee is stable to varus and valgus stress testing  Mild varus alignment of the limb  Lachman negative  Posterior drawer negative  Lee's negative  Patellofemoral grind +  Sensation grossly intact to light tough throughout the lower extremity  Skin is intact  Distal pulses are 2+  No signs or symptoms of DVT    Exam of the contralateral knee is normal:  No muscle atrophy, erythema, ecchymosis, or gross deformity noted  No knee effusion  No medial or lateral joint line tenderness  Full active range of motion  5/5 strength flexion and extension  The knee is stable to varus and valgus stress testing  Lachman negative  Posterior drawer negative  Lee's negative  Patellofemoral grind negative  Sensation grossly intact to light tough throughout the lower extremity  Skin is intact  Distal pulses are intact  No signs or symptoms of DVT    Bilateral Hip exam:  No atrophy, erythema, ecchymosis, or gross deformity noted  No tenderness to palpation  Slightly dimished active range of motion, mild lack of IR but nonpainful  5/5 strength in hip flexion, abduction, and adduction  Anterior, lateral, and posterior hip impingement tests negative  Stinchfield and straight leg raise  negative  DARLINE negative    Diagnostic Studies:     Imaging was personally and individually reviewed and discussed at length with the patient:    4V left knee(s) were reviewed in the office today, including AP, flexion PA, lateral, and sunrise views:  Weight bearing views show significant degenerative changes in all three compartments with the medial compartment being most affected.  There is osteophyte formation throughout all three compartments.  There is end-stage osteoarthritis seen with bone-on-bone contact appreciated.  There is no evidence of fracture or dislocation.  No periosteal reactions or medullary lesions are seen. Patellar height and alignment are within normal limits.     Thalassemia, thrombopenia  Hgb: 16.0  Plt: 84  Alb: 3.8 (07/16/2019)  Cr: 1.21  GFR: 65  MSSA/MRSA: Negative      Assessment:     Left Knee Osteoarthritis (severe) varus        Plan:     Based on x-rays, history, and office evaluation, we have diagnosed Russ Wheeler Jr. with left knee arthritis. At this time, they have failed a conservative treatment program, medications, cortisone injections, physical therapy are no longer working.  Their symptoms are no longer relieved and have become more severe enough to significantly deteriorate their quality of life and affect their activities of daily living.  Their disability is significant enough to drastically alter their lifestyle.  Given the patient's symptoms and request, a left total knee arthroplasty is indicated.       The spectrum of treatment options were discussed with the patient in detail including both the nonoperative and operative treatment modalities and their respective risks and benefits.  After thorough discussion, the patient has elected to undergo surgical treatment.  The details of the surgical procedure were explained including the location of probable incisions and a description of the likely implants to be used.  Models and diagrams were used as educational  resources. The patient understands the likely convalescence after surgery, as well as the rehabilitation required.  We thoroughly discussed the risks, benefits, and alternatives to surgery.  The risks include but are not limited to the risk of infection, joint stiffness, blood clots (including DVT and/or pulmonary embolus along with the risk of death), neurologic and/or vascular injury, fracture, dislocation, nonunion, malunion, need for further surgery including hardware failure requiring revision, and continued pain.  It was explained that if tissue has been repaired or reconstructed, there is also a chance of failure which may require further management.  In addition, we have discussed the risks, including the risk of disease transmission, associated with any allograft tissue which may be utilized.  Following the completion of the discussion, the patient expressed understanding of this planned course of care, all their questions were answered and consent will be obtained preoperatively.    Left TKA @ [facility] (Obs)    No smoking  No diabetes - Last A1c value was  % done  .  BMI - 33  Yes AYDEE - CPAP Compliant  No hx of infections/MRSA/dental/joint  No hx of blood clots   yes blood thinner - Eliquis  Last injection - 12/11/2024  No BPH symptoms  No lumbar surgery  No allergy  Albumin - 3.8   Yes Cardiac history - A. Fib, CHF  **thrombopenia, thalassemia  No Pulmonary history       - risks, benefits and alternatives discussed  - labs reviewed and meds given  including ppx abx  - proceed with left total knee arthroplasty             Diagnoses and all orders for this visit:    Primary osteoarthritis of left knee

## 2025-03-27 ENCOUNTER — ANESTHESIA EVENT (OUTPATIENT)
Dept: SURGERY | Facility: HOSPITAL | Age: 70
End: 2025-03-27
Payer: MEDICARE

## 2025-03-30 NOTE — ANESTHESIA PREPROCEDURE EVALUATION
PRE-OP EVALUATION    Patient Name: Russ Wheeler Jr.    Admit Diagnosis: PRIMARY OSTEOATHRITIS LEFT KNEE    Pre-op Diagnosis: PRIMARY OSTEOATHRITIS LEFT KNEE    LEFT TOTAL KNEE ARTHROPLASTY    Anesthesia Procedure: LEFT TOTAL KNEE ARTHROPLASTY (Left)    Surgeons and Role:     * Bora Rodriguez MD - Primary    Pre-op vitals reviewed.  Temp: 97.8 °F (36.6 °C)  Pulse: 50  Resp: 18  BP: 155/97  SpO2: 99 %  Body mass index is 34.86 kg/m².    Current medications reviewed.  Hospital Medications:   acetaminophen (Tylenol Extra Strength) tab 1,000 mg  1,000 mg Oral Once    lactated ringers infusion   Intravenous Continuous    tranexamic acid in sodium chloride 0.7% (Cyklokapron) 1000 mg/100mL infusion premix 1,000 mg  1,000 mg Intravenous Once    ceFAZolin (Ancef) 3 g in sodium chloride 0.9% 100mL IVPB premix  3 g Intravenous Once    tranexamic acid in sodium chloride 0.7% (Cyklokapron) 1000 mg/100mL infusion premix 1,000 mg  1,000 mg Intravenous Once    povidone-iodine (Betadine) 10 % 17.5 mL in sodium chloride 0.9 % 500 mL irrigation   Irrigation Once (Intra-Op)    clonidine/epinephrine/ropivacaine/ketorolac in 0.9% NaCl 60 mL pain cocktail syringe for knee arthroplasty   Infiltration Once (Intra-Op)       Outpatient Medications:   Prescriptions Prior to Admission[1]    Allergies: Patient has no known allergies.      Anesthesia Evaluation        Anesthetic Complications           GI/Hepatic/Renal             (+) chronic renal disease and CRI                   Cardiovascular                (+) obesity  (+) hypertension                  (+) dysrhythmias (persistent AF on flecainide, BB. 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 repeat 3 years    Colonoscopy  01/2018    diverticulosis, multiple polyps (7 adenomas from 3-10 mm in ascending and transverse colon). repeat 3 yrs    Colonoscopy      Other surgical history      DENTAL    Other surgical history      EXPLORATORY LAP. AT 6 Y/O.    Other surgical history  1975    L LEG SKIN GRAFT     Social History     Socioeconomic History    Marital status:      Spouse name: WIDOWERED (SHANTAL SOLIS    Number of children: 3    Years of education: MASTERS   Occupational History    Occupation: RETIRED    Occupation: VOLUNTEER   Tobacco Use    Smoking status: Never    Smokeless tobacco: Never   Vaping Use    Vaping status: Never Used   Substance and Sexual Activity    Alcohol use: Yes     Alcohol/week: 0.0 standard drinks of alcohol     Comment: ABOUT 3-5  DRINKS/WK. DENIES BINGE since ABOUT 2000.     Drug use: No     Comment: NO MARIJUANA SINCE YOUTH.    Sexual activity: Not Currently     Partners: Female   Other Topics Concern     Service No    Blood Transfusions No    Caffeine Concern No     Comment: 2-3 CUPS COFFEE/D     Weight Concern Yes    Special Diet Yes    Exercise Yes     Comment: MILD    Bike Helmet Yes    Seat Belt Yes     History   Drug Use No     Comment: NO MARIJUANA SINCE YOUTH.     Available pre-op labs reviewed.  Lab Results   Component Value Date    WBC 5.3 03/24/2025    RBC 7.15 (H) 03/24/2025    HGB 16.0 03/24/2025    HCT 50.7 03/24/2025    MCV 70.9 (L) 03/24/2025    MCH 22.4 (L) 03/24/2025    MCHC 31.6 03/24/2025    RDW 17.7 03/24/2025    PLT 84.0 (L) 03/24/2025     Lab Results   Component Value Date     03/24/2025    K 3.8 03/24/2025     03/24/2025    CO2 31.0 03/24/2025    BUN 14 03/24/2025    CREATSERUM 1.21 03/24/2025     (H) 03/24/2025    CA 9.5 03/24/2025            Airway      Mallampati:  II  Mouth opening: 3 FB  TM distance: 4 - 6 cm  Neck ROM: full Cardiovascular      Rhythm: regular  Rate: normal     Dental    Dentition appears grossly intact         Pulmonary      Breath sounds clear to auscultation bilaterally.               Other findings              ASA: 3   Plan: spinal  NPO status verified and patient meets guidelines.  Patient has not taken beta blockers in last 24 hours.  Post-procedure pain management plan discussed with surgeon and patient.    Comment: Discussed adductor canal block for post op pain. Risks of regional anesthesia including block failure, bleeding, infection, nerve damage, and seizure discussed with patient. Questions were encouraged and answered. Pt is in agreement with the plan.     Plan/risks discussed with: patient and spouse (Risks of spinal discussed including bleeding, infection, damage to nerves (all very rare), headache, need to convert to GA and those risks including N/V, sore throat, dental injruy, cardiopulmonary compromise. Pt in agreement with plan.)                Present on Admission:  **None**             [1]   Medications Prior to Admission   Medication Sig Dispense Refill Last Dose/Taking    hydroCHLOROthiazide 50 MG Oral Tab Take 1 tablet (50 mg total) by mouth daily.   3/31/2025 at  3:00 AM    Bioflavonoid Products (BIOFLEX) Oral Tab Take 1 tablet by mouth daily.   3/29/2025    pravastatin 20 MG Oral Tab Take 1 tablet (20 mg total) by mouth nightly. 30 tablet 0 3/30/2025 at  7:00 PM    apixaban (ELIQUIS) 5 MG Oral Tab Take 1 tablet (5 mg total) by mouth 2 (two) times daily. 180 tablet 3 3/27/2025    carvedilol 25 MG Oral Tab Take 1 tablet (25 mg total) by mouth 2 (two) times daily. 180 tablet 3 3/31/2025 at  3:00 AM    Flecainide Acetate 100 MG Oral Tab Take 1 tablet (100 mg total) by mouth 2 (two) times daily. 180 tablet 3 3/31/2025 at  3:00 AM    melatonin 5 MG Oral Cap Take 1 capsule (5 mg total) by mouth nightly as needed.   Past Month     Tadalafil 20 MG Oral Tab Take 1 tablet (20 mg total) by mouth daily as needed for Erectile Dysfunction. 30 tablet 1 Taking As Needed    oxyCODONE 5 MG Oral Tab Take 0.5-1 tablets (2.5-5 mg total) by mouth every 4 (four) hours as needed for Pain. Post op   Unknown    traMADol 50 MG Oral Tab Take 1 tablet (50 mg total) by mouth every 4 to 6 hours as needed for Pain. Post op   Unknown    celecoxib 200 MG Oral Cap Take 1 capsule (200 mg total) by mouth daily. Post op   Unknown    pregabalin 75 MG Oral Cap Take 1 capsule (75 mg total) by mouth daily. Post op   Unknown    cephALEXin 500 MG Oral Cap Take 1 capsule (500 mg total) by mouth 2 (two) times daily. Post op for 10 days.       sennosides-docusate 8.6-50 MG Oral Tab Take 1 tablet by mouth daily. Post op       apixaban 2.5 MG Oral Tab Take 1 tablet (2.5 mg total) by mouth 2 (two) times daily. Take 1 tablet (2.5 mg total) by mouth 2 (two) times daily for 5 days. And then resume preop dose of eliquies.

## 2025-03-31 ENCOUNTER — ANESTHESIA (OUTPATIENT)
Dept: SURGERY | Facility: HOSPITAL | Age: 70
End: 2025-03-31
Payer: MEDICARE

## 2025-03-31 ENCOUNTER — HOSPITAL ENCOUNTER (OUTPATIENT)
Facility: HOSPITAL | Age: 70
Discharge: HOME HEALTH | End: 2025-04-01
Attending: ORTHOPAEDIC SURGERY | Admitting: ORTHOPAEDIC SURGERY
Payer: MEDICARE

## 2025-03-31 ENCOUNTER — APPOINTMENT (OUTPATIENT)
Dept: GENERAL RADIOLOGY | Facility: HOSPITAL | Age: 70
End: 2025-03-31
Attending: ORTHOPAEDIC SURGERY
Payer: MEDICARE

## 2025-03-31 DIAGNOSIS — Z01.818 PRE-OP TESTING: Primary | ICD-10-CM

## 2025-03-31 LAB — RH BLOOD TYPE: POSITIVE

## 2025-03-31 PROCEDURE — 76942 ECHO GUIDE FOR BIOPSY: CPT | Performed by: ANESTHESIOLOGY

## 2025-03-31 PROCEDURE — 0SRD0J9 REPLACEMENT OF LEFT KNEE JOINT WITH SYNTHETIC SUBSTITUTE, CEMENTED, OPEN APPROACH: ICD-10-PCS | Performed by: ORTHOPAEDIC SURGERY

## 2025-03-31 PROCEDURE — 97161 PT EVAL LOW COMPLEX 20 MIN: CPT

## 2025-03-31 PROCEDURE — 73560 X-RAY EXAM OF KNEE 1 OR 2: CPT | Performed by: ORTHOPAEDIC SURGERY

## 2025-03-31 PROCEDURE — 97116 GAIT TRAINING THERAPY: CPT

## 2025-03-31 PROCEDURE — 88311 DECALCIFY TISSUE: CPT | Performed by: ORTHOPAEDIC SURGERY

## 2025-03-31 PROCEDURE — 88305 TISSUE EXAM BY PATHOLOGIST: CPT | Performed by: ORTHOPAEDIC SURGERY

## 2025-03-31 PROCEDURE — 94660 CPAP INITIATION&MGMT: CPT

## 2025-03-31 DEVICE — IMPLANTABLE DEVICE
Type: IMPLANTABLE DEVICE | Site: KNEE | Status: FUNCTIONAL
Brand: BIOMET® BONE CEMENT R

## 2025-03-31 DEVICE — IMPLANTABLE DEVICE
Type: IMPLANTABLE DEVICE | Site: KNEE | Status: FUNCTIONAL
Brand: PERSONA® VIVACIT-E®

## 2025-03-31 DEVICE — IMPLANTABLE DEVICE
Type: IMPLANTABLE DEVICE | Site: KNEE | Status: FUNCTIONAL
Brand: PERSONA® NATURAL TIBIA®

## 2025-03-31 DEVICE — IMPLANTABLE DEVICE
Type: IMPLANTABLE DEVICE | Site: KNEE | Status: FUNCTIONAL
Brand: PERSONA®

## 2025-03-31 RX ORDER — ACETAMINOPHEN 325 MG/1
650 TABLET ORAL
Status: DISCONTINUED | OUTPATIENT
Start: 2025-03-31 | End: 2025-04-01

## 2025-03-31 RX ORDER — OXYCODONE HYDROCHLORIDE 5 MG/1
2.5 TABLET ORAL ONCE AS NEEDED
Status: COMPLETED | OUTPATIENT
Start: 2025-03-31 | End: 2025-03-31

## 2025-03-31 RX ORDER — TRANEXAMIC ACID 10 MG/ML
1000 INJECTION, SOLUTION INTRAVENOUS ONCE
Status: COMPLETED | OUTPATIENT
Start: 2025-03-31 | End: 2025-03-31

## 2025-03-31 RX ORDER — ACETAMINOPHEN 325 MG/1
650 TABLET ORAL ONCE
Status: COMPLETED | OUTPATIENT
Start: 2025-03-31 | End: 2025-03-31

## 2025-03-31 RX ORDER — SENNOSIDES 8.6 MG
17.2 TABLET ORAL NIGHTLY
Status: DISCONTINUED | OUTPATIENT
Start: 2025-03-31 | End: 2025-04-01

## 2025-03-31 RX ORDER — PREGABALIN 75 MG/1
75 CAPSULE ORAL DAILY
COMMUNITY

## 2025-03-31 RX ORDER — HYDROCHLOROTHIAZIDE 50 MG/1
50 TABLET ORAL DAILY
COMMUNITY

## 2025-03-31 RX ORDER — CEFAZOLIN SODIUM IN 0.9 % NACL 3 G/100 ML
3 INTRAVENOUS SOLUTION, PIGGYBACK (ML) INTRAVENOUS EVERY 8 HOURS
Status: COMPLETED | OUTPATIENT
Start: 2025-03-31 | End: 2025-04-01

## 2025-03-31 RX ORDER — PREGABALIN 75 MG/1
75 CAPSULE ORAL DAILY
Status: DISCONTINUED | OUTPATIENT
Start: 2025-03-31 | End: 2025-04-01

## 2025-03-31 RX ORDER — HYDROMORPHONE HYDROCHLORIDE 1 MG/ML
0.4 INJECTION, SOLUTION INTRAMUSCULAR; INTRAVENOUS; SUBCUTANEOUS EVERY 2 HOUR PRN
Status: DISCONTINUED | OUTPATIENT
Start: 2025-03-31 | End: 2025-04-01

## 2025-03-31 RX ORDER — OXYCODONE HYDROCHLORIDE 5 MG/1
5 TABLET ORAL ONCE AS NEEDED
Status: COMPLETED | OUTPATIENT
Start: 2025-03-31 | End: 2025-03-31

## 2025-03-31 RX ORDER — HYDROMORPHONE HYDROCHLORIDE 1 MG/ML
0.4 INJECTION, SOLUTION INTRAMUSCULAR; INTRAVENOUS; SUBCUTANEOUS EVERY 5 MIN PRN
Status: DISCONTINUED | OUTPATIENT
Start: 2025-03-31 | End: 2025-03-31 | Stop reason: HOSPADM

## 2025-03-31 RX ORDER — SODIUM CHLORIDE, SODIUM LACTATE, POTASSIUM CHLORIDE, CALCIUM CHLORIDE 600; 310; 30; 20 MG/100ML; MG/100ML; MG/100ML; MG/100ML
INJECTION, SOLUTION INTRAVENOUS CONTINUOUS
Status: DISCONTINUED | OUTPATIENT
Start: 2025-03-31 | End: 2025-04-01

## 2025-03-31 RX ORDER — ONDANSETRON 2 MG/ML
4 INJECTION INTRAMUSCULAR; INTRAVENOUS EVERY 6 HOURS PRN
Status: DISCONTINUED | OUTPATIENT
Start: 2025-03-31 | End: 2025-03-31 | Stop reason: HOSPADM

## 2025-03-31 RX ORDER — METOCLOPRAMIDE HYDROCHLORIDE 5 MG/ML
10 INJECTION INTRAMUSCULAR; INTRAVENOUS EVERY 8 HOURS PRN
Status: DISCONTINUED | OUTPATIENT
Start: 2025-03-31 | End: 2025-04-01

## 2025-03-31 RX ORDER — FAMOTIDINE 10 MG/ML
20 INJECTION, SOLUTION INTRAVENOUS 2 TIMES DAILY
Status: DISCONTINUED | OUTPATIENT
Start: 2025-03-31 | End: 2025-04-01

## 2025-03-31 RX ORDER — MIDAZOLAM HYDROCHLORIDE 1 MG/ML
INJECTION INTRAMUSCULAR; INTRAVENOUS AS NEEDED
Status: DISCONTINUED | OUTPATIENT
Start: 2025-03-31 | End: 2025-03-31 | Stop reason: SURG

## 2025-03-31 RX ORDER — CEPHALEXIN 500 MG/1
500 CAPSULE ORAL 2 TIMES DAILY
COMMUNITY

## 2025-03-31 RX ORDER — ACETAMINOPHEN 500 MG
1000 TABLET ORAL ONCE
Status: DISCONTINUED | OUTPATIENT
Start: 2025-03-31 | End: 2025-03-31 | Stop reason: HOSPADM

## 2025-03-31 RX ORDER — POLYETHYLENE GLYCOL 3350 17 G/17G
17 POWDER, FOR SOLUTION ORAL DAILY PRN
Status: DISCONTINUED | OUTPATIENT
Start: 2025-03-31 | End: 2025-04-01

## 2025-03-31 RX ORDER — TRAMADOL HYDROCHLORIDE 50 MG/1
50 TABLET ORAL EVERY 6 HOURS SCHEDULED
Status: DISCONTINUED | OUTPATIENT
Start: 2025-03-31 | End: 2025-04-01

## 2025-03-31 RX ORDER — HYDROMORPHONE HYDROCHLORIDE 1 MG/ML
0.8 INJECTION, SOLUTION INTRAMUSCULAR; INTRAVENOUS; SUBCUTANEOUS EVERY 2 HOUR PRN
Status: DISCONTINUED | OUTPATIENT
Start: 2025-03-31 | End: 2025-04-01

## 2025-03-31 RX ORDER — ACETAMINOPHEN 325 MG/1
TABLET ORAL
Status: COMPLETED
Start: 2025-03-31 | End: 2025-03-31

## 2025-03-31 RX ORDER — AMOXICILLIN 250 MG
1 CAPSULE ORAL DAILY
COMMUNITY

## 2025-03-31 RX ORDER — TRANEXAMIC ACID 10 MG/ML
INJECTION, SOLUTION INTRAVENOUS
Status: COMPLETED
Start: 2025-03-31 | End: 2025-03-31

## 2025-03-31 RX ORDER — TRAMADOL HYDROCHLORIDE 50 MG/1
1 TABLET ORAL
COMMUNITY

## 2025-03-31 RX ORDER — FLECAINIDE ACETATE 50 MG/1
100 TABLET ORAL 2 TIMES DAILY
Status: DISCONTINUED | OUTPATIENT
Start: 2025-03-31 | End: 2025-04-01

## 2025-03-31 RX ORDER — DEXAMETHASONE SODIUM PHOSPHATE 10 MG/ML
8 INJECTION, SOLUTION INTRAMUSCULAR; INTRAVENOUS ONCE
Status: COMPLETED | OUTPATIENT
Start: 2025-04-01 | End: 2025-04-01

## 2025-03-31 RX ORDER — ONDANSETRON 2 MG/ML
4 INJECTION INTRAMUSCULAR; INTRAVENOUS EVERY 6 HOURS PRN
Status: DISCONTINUED | OUTPATIENT
Start: 2025-03-31 | End: 2025-04-01

## 2025-03-31 RX ORDER — CELECOXIB 200 MG/1
200 CAPSULE ORAL DAILY
COMMUNITY

## 2025-03-31 RX ORDER — OXYCODONE HYDROCHLORIDE 5 MG/1
5 TABLET ORAL EVERY 4 HOURS PRN
Status: DISCONTINUED | OUTPATIENT
Start: 2025-03-31 | End: 2025-04-01

## 2025-03-31 RX ORDER — HYDROMORPHONE HYDROCHLORIDE 1 MG/ML
0.6 INJECTION, SOLUTION INTRAMUSCULAR; INTRAVENOUS; SUBCUTANEOUS EVERY 5 MIN PRN
Status: DISCONTINUED | OUTPATIENT
Start: 2025-03-31 | End: 2025-03-31 | Stop reason: HOSPADM

## 2025-03-31 RX ORDER — CEFAZOLIN SODIUM IN 0.9 % NACL 3 G/100 ML
3 INTRAVENOUS SOLUTION, PIGGYBACK (ML) INTRAVENOUS ONCE
Status: COMPLETED | OUTPATIENT
Start: 2025-03-31 | End: 2025-03-31

## 2025-03-31 RX ORDER — FAMOTIDINE 20 MG/1
20 TABLET, FILM COATED ORAL 2 TIMES DAILY
Status: DISCONTINUED | OUTPATIENT
Start: 2025-03-31 | End: 2025-04-01

## 2025-03-31 RX ORDER — CARVEDILOL 12.5 MG/1
25 TABLET ORAL 2 TIMES DAILY
Status: DISCONTINUED | OUTPATIENT
Start: 2025-03-31 | End: 2025-04-01

## 2025-03-31 RX ORDER — BUPIVACAINE HYDROCHLORIDE 2.5 MG/ML
INJECTION, SOLUTION EPIDURAL; INFILTRATION; INTRACAUDAL; PERINEURAL AS NEEDED
Status: DISCONTINUED | OUTPATIENT
Start: 2025-03-31 | End: 2025-03-31 | Stop reason: SURG

## 2025-03-31 RX ORDER — DOCUSATE SODIUM 100 MG/1
100 CAPSULE, LIQUID FILLED ORAL 2 TIMES DAILY
Status: DISCONTINUED | OUTPATIENT
Start: 2025-03-31 | End: 2025-04-01

## 2025-03-31 RX ORDER — METOCLOPRAMIDE HYDROCHLORIDE 5 MG/ML
10 INJECTION INTRAMUSCULAR; INTRAVENOUS EVERY 8 HOURS PRN
Status: DISCONTINUED | OUTPATIENT
Start: 2025-03-31 | End: 2025-03-31 | Stop reason: HOSPADM

## 2025-03-31 RX ORDER — OXYCODONE HYDROCHLORIDE 5 MG/1
10 TABLET ORAL ONCE AS NEEDED
Status: COMPLETED | OUTPATIENT
Start: 2025-03-31 | End: 2025-03-31

## 2025-03-31 RX ORDER — DIPHENHYDRAMINE HYDROCHLORIDE 50 MG/ML
25 INJECTION, SOLUTION INTRAMUSCULAR; INTRAVENOUS ONCE AS NEEDED
Status: ACTIVE | OUTPATIENT
Start: 2025-03-31 | End: 2025-03-31

## 2025-03-31 RX ORDER — OXYCODONE HYDROCHLORIDE 5 MG/1
TABLET ORAL EVERY 4 HOURS PRN
COMMUNITY

## 2025-03-31 RX ORDER — DEXAMETHASONE SODIUM PHOSPHATE 10 MG/ML
INJECTION, SOLUTION INTRAMUSCULAR; INTRAVENOUS AS NEEDED
Status: DISCONTINUED | OUTPATIENT
Start: 2025-03-31 | End: 2025-03-31 | Stop reason: SURG

## 2025-03-31 RX ORDER — IBUPROFEN 600 MG/1
600 TABLET, FILM COATED ORAL EVERY 6 HOURS SCHEDULED
Status: DISCONTINUED | OUTPATIENT
Start: 2025-04-01 | End: 2025-04-01

## 2025-03-31 RX ORDER — OXYCODONE HYDROCHLORIDE 5 MG/1
TABLET ORAL
Status: COMPLETED
Start: 2025-03-31 | End: 2025-03-31

## 2025-03-31 RX ORDER — PRAVASTATIN SODIUM 20 MG
20 TABLET ORAL NIGHTLY
Status: DISCONTINUED | OUTPATIENT
Start: 2025-03-31 | End: 2025-04-01

## 2025-03-31 RX ORDER — DIPHENHYDRAMINE HCL 25 MG
25 CAPSULE ORAL EVERY 4 HOURS PRN
Status: DISCONTINUED | OUTPATIENT
Start: 2025-03-31 | End: 2025-04-01

## 2025-03-31 RX ORDER — SODIUM PHOSPHATE, DIBASIC AND SODIUM PHOSPHATE, MONOBASIC 7; 19 G/230ML; G/230ML
1 ENEMA RECTAL ONCE AS NEEDED
Status: DISCONTINUED | OUTPATIENT
Start: 2025-03-31 | End: 2025-04-01

## 2025-03-31 RX ORDER — OXYCODONE HYDROCHLORIDE 10 MG/1
10 TABLET ORAL EVERY 4 HOURS PRN
Status: DISCONTINUED | OUTPATIENT
Start: 2025-03-31 | End: 2025-04-01

## 2025-03-31 RX ORDER — SODIUM CHLORIDE, SODIUM LACTATE, POTASSIUM CHLORIDE, CALCIUM CHLORIDE 600; 310; 30; 20 MG/100ML; MG/100ML; MG/100ML; MG/100ML
INJECTION, SOLUTION INTRAVENOUS CONTINUOUS
Status: DISCONTINUED | OUTPATIENT
Start: 2025-03-31 | End: 2025-03-31 | Stop reason: HOSPADM

## 2025-03-31 RX ORDER — HYDROMORPHONE HYDROCHLORIDE 1 MG/ML
0.2 INJECTION, SOLUTION INTRAMUSCULAR; INTRAVENOUS; SUBCUTANEOUS EVERY 5 MIN PRN
Status: DISCONTINUED | OUTPATIENT
Start: 2025-03-31 | End: 2025-03-31 | Stop reason: HOSPADM

## 2025-03-31 RX ORDER — BISACODYL 10 MG
10 SUPPOSITORY, RECTAL RECTAL
Status: DISCONTINUED | OUTPATIENT
Start: 2025-03-31 | End: 2025-04-01

## 2025-03-31 RX ORDER — DIPHENHYDRAMINE HYDROCHLORIDE 50 MG/ML
12.5 INJECTION, SOLUTION INTRAMUSCULAR; INTRAVENOUS EVERY 4 HOURS PRN
Status: DISCONTINUED | OUTPATIENT
Start: 2025-03-31 | End: 2025-04-01

## 2025-03-31 RX ORDER — FERROUS SULFATE 325(65) MG
325 TABLET, DELAYED RELEASE (ENTERIC COATED) ORAL
Status: DISCONTINUED | OUTPATIENT
Start: 2025-03-31 | End: 2025-04-01

## 2025-03-31 RX ORDER — KETOROLAC TROMETHAMINE 30 MG/ML
15 INJECTION, SOLUTION INTRAMUSCULAR; INTRAVENOUS EVERY 6 HOURS
Status: COMPLETED | OUTPATIENT
Start: 2025-03-31 | End: 2025-04-01

## 2025-03-31 RX ORDER — NALOXONE HYDROCHLORIDE 0.4 MG/ML
0.08 INJECTION, SOLUTION INTRAMUSCULAR; INTRAVENOUS; SUBCUTANEOUS AS NEEDED
Status: DISCONTINUED | OUTPATIENT
Start: 2025-03-31 | End: 2025-03-31 | Stop reason: HOSPADM

## 2025-03-31 RX ADMIN — SODIUM CHLORIDE, SODIUM LACTATE, POTASSIUM CHLORIDE, CALCIUM CHLORIDE: 600; 310; 30; 20 INJECTION, SOLUTION INTRAVENOUS at 07:21:00

## 2025-03-31 RX ADMIN — MIDAZOLAM HYDROCHLORIDE 2 MG: 1 INJECTION INTRAMUSCULAR; INTRAVENOUS at 07:07:00

## 2025-03-31 RX ADMIN — CEFAZOLIN SODIUM IN 0.9 % NACL 3 G: 3 G/100 ML INTRAVENOUS SOLUTION, PIGGYBACK (ML) INTRAVENOUS at 07:38:00

## 2025-03-31 RX ADMIN — DEXAMETHASONE SODIUM PHOSPHATE 2 MG: 10 INJECTION, SOLUTION INTRAMUSCULAR; INTRAVENOUS at 07:28:00

## 2025-03-31 RX ADMIN — BUPIVACAINE HYDROCHLORIDE 25 ML: 2.5 INJECTION, SOLUTION EPIDURAL; INFILTRATION; INTRACAUDAL; PERINEURAL at 07:28:00

## 2025-03-31 RX ADMIN — TRANEXAMIC ACID 1000 MG: 10 INJECTION, SOLUTION INTRAVENOUS at 07:38:00

## 2025-03-31 NOTE — CM/SW NOTE
Met with patient and provided [facility] information for [facility]  Pt agreeable with DC plan.  No further DC needs/concerns identified at this time.  SW available should additional discharge needs arise.    Rowan Stanley, [facility]  Discharge Planner  [phone]

## 2025-03-31 NOTE — ANESTHESIA PROCEDURE NOTES
Spinal Block    Date/Time: 3/31/2025 7:09 AM    Performed by: Deangelo Boles MD  Authorized by: Deangelo Boles MD      General Information and Staff    Start Time:  3/31/2025 7:09 AM  End Time:  3/31/2025 7:19 AM  Anesthesiologist:  Deangelo Boles MD  Performed by:  Anesthesiologist  Patient Location:  OR  Site identification: surface landmarks    Preanesthetic Checklist: patient identified, IV checked, risks and benefits discussed, monitors and equipment checked, pre-op evaluation, timeout performed, anesthesia consent and sterile technique used      Procedure Details    Patient Position:  Sitting  Prep: ChloraPrep    Monitoring:  Cardiac monitor, heart rate and continuous pulse ox  Approach:  Midline  Location:  L3-4  Injection Technique:  Single-shot    Needle    Needle Type:  Sprotte  Needle Gauge:  24 G  Needle Length:  3.5 in    Assessment    Sensory Level:   Events: clear CSF, CSF aspirated, well tolerated and blood negative      Additional Comments

## 2025-03-31 NOTE — PHYSICAL THERAPY NOTE
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  Prior to admission, patient's baseline is indep.  Patient is currently functioning near baseline with bed mobility, transfers, gait, stair negotiation, maintaining seated position, and standing prolonged periods.  Patient is requiring contact guard assist as a result of the following impairments: decreased functional strength, pain, impaired standing balance, decreased muscular endurance, and limited LLE ROM.  Physical Therapy will continue to follow for duration of hospitalization.    Patient will benefit from continued skilled PT Services at discharge to promote prior level of function and safety with additional support and return home with home health PT.    PLAN DURING HOSPITALIZATION  Nursing Mobility Recommendation : 1 Assist  PT Device Recommendation: Rolling walker  PT Treatment Plan: Bed mobility, Body mechanics, Coordination, Endurance, Energy conservation, Patient education, Family education, Gait training, Neuromuscular re-educate, Range of motion, Strengthening, Stoop training, Stair training, Balance training, Transfer training  Rehab Potential : Good  Frequency (Obs): Daily     CURRENT GOALS  Goal #1  Patient is able to demonstrate supine - sit EOB @ level: supervision     Goal #2  Patient is able to demonstrate transfers Sit to/from Stand at assistance level: supervision   Goal #3    Patient is able to ambulate 150 feet with assistive device at assistance level:  supervision   Goal #4    Patient will negotiate 4 stairs/one curb w/ assistive device and supervision   Goal #5    Patient verbalizes and/or demonstrates all precautions and safety concerns independently    Goal #6      Goal Comments: Goals established on 3/31/2025    PHYSICAL THERAPY MEDICAL/SOCIAL HISTORY  History related to current admission: Patient is a 69 year old male admitted on 3/31/2025 from home for L TKA.    HOME SITUATION  Type of Home: [facility]   Home Layout: Two level (+ basement)  Stairs to Enter : 1 (threshold step)  Railing: No  Stairs to Bedroom: 12  Railing: Yes  Lives With: Other (Comment) (Life partner)  Drives: Yes  Patient Regularly Uses: Glasses (owns RW and cane)    Prior Level of [place]:   Per pt- lives in two story [facility] + basement. Bed/bath on second floor, spends his evenings in the basement. Lives with life partner. Ambulates indep at baseline, but owns RW and cane. Retired. Drives. No history of falls.    SUBJECTIVE  \"It feels steady!\"    OBJECTIVE  Precautions: Bed/chair alarm  Fall Risk: Standard fall risk    WEIGHT BEARING RESTRICTION  L Lower Extremity: Weight Bearing as Tolerated    PAIN ASSESSMENT  Ratin  Location: left knee  Management Techniques: Activity promotion, Body mechanics, Repositioning (ice)    COGNITION  Overall Cognitive Status:  WFL - within functional limits    RANGE OF MOTION AND STRENGTH ASSESSMENT  Upper extremity ROM and strength are within functional limits   LEFT Lower extremity ROM is limited as expected s/p TKA  LEFT  Lower extremity strength is limited as expected s/p TKA    BALANCE  Static Sitting: Good  Dynamic Sitting: Fair +  Static Standing: Fair  Dynamic Standing: Fair -    ADDITIONAL TESTS                                    ACTIVITY TOLERANCE                         O2 WALK       NEUROLOGICAL FINDINGS                        AM-PAC '6-Clicks' INPATIENT SHORT FORM - BASIC MOBILITY  How much difficulty does the patient currently  have...  Patient Difficulty: Turning over in bed (including adjusting bedclothes, sheets and blankets)?: A Little   Patient Difficulty: Sitting down on and standing up from a chair with arms (e.g., wheelchair, bedside commode, etc.): A Little   Patient Difficulty: Moving from lying on back to sitting on the side of the bed?: A Little   How much help from another person does the patient currently need...   Help from Another: Moving to and from a bed to a chair (including a wheelchair)?: A Little   Help from Another: Need to walk in hospital room?: A Little   Help from Another: Climbing 3-5 steps with a railing?: A Little       AM-PAC Score:  Raw Score: 18   Approx Degree of Impairment: 46.58%   Standardized Score (AM-PAC Scale): 43.63   CMS Modifier (G-Code): CK    FUNCTIONAL ABILITY STATUS  Gait Assessment   Functional Mobility/Gait Assessment  Gait Assistance: Contact guard assist, Supervision  Distance (ft): 150  Assistive Device: Rolling walker  Pattern: L Decreased stance time    Skilled Therapy Provided  Educated on WBAT to LLE- pt verbalizes understanding   Educated on importance of continued out of bed mobility and ambulation with assistance from nursing staff and use of RW  Encouraged continued use of ice for pain and swelling management    Bed mobility> sit to stand to RW> ambulated 150 feet with RW> upright in chair at end of session    *no buckling of LLE observed     Bed Mobility:  Rolling: NT  Supine to sit: supervision with HOB elevated   Sit to supine: NT     Transfer Mobility:  Sit to stand: CGA to RW- vc for hand placement   Stand to sit: CGA - cues to extend LLE for ease of transfer  Gait = CGA progressed to supervision with RW x 150 feet- decr LLE stance time. Step to pattern. Incr reliance BUE for offloading.     Therapist's Comments:   RN gave clearance to see patient. Discussed role and goal of physical therapy in hospital setting. Life partner present for session. Pt in agreement to session.      Exercise/Education Provided:  Bed mobility  Body mechanics  Energy conservation  Functional activity tolerated  Gait training  Posture  Transfer training    Patient End of Session: Up in chair, Needs met, Call light within reach, RN aware of session/findings, All patient questions and concerns addressed, Hospital anti-slip socks, Alarm set, SCDs in place, Discussed recommendations with /, Family present, Ice applied    Patient Evaluation Complexity Level:  History Moderate - 1 or 2 personal factors and/or co-morbidities   Examination of body systems Low -  addressing 1-2 elements   Clinical Presentation Low- Stable   Clinical Decision Making Low Complexity     PT Session Time: 30 minutes  Gait Training: 15 minutes

## 2025-03-31 NOTE — PLAN OF CARE
NURSING ADMISSION NOTE      Patient admitted via  bed from pacu  Oriented to room.  Safety precautions initiated.  Bed in low position.  Call light in reach.    Skin check completed with PCT Chichi present. No skin breakdown noted.

## 2025-03-31 NOTE — ANESTHESIA PROCEDURE NOTES
Regional Block    Date/Time: 3/31/2025 7:21 AM    Performed by: Deangelo Boles MD  Authorized by: Deangelo Boles MD      General Information and Staff    Start Time:  3/31/2025 7:21 AM  End Time:  3/31/2025 7:28 AM  Anesthesiologist:  Deangelo Boles MD  Performed by:  Anesthesiologist  Patient Location:  OR    Block Placement: Post Induction  Site Identification: real time ultrasound guided and image stored and retrievable    Block site/laterality marked before start: site marked  Reason for Block: at surgeon's request and post-op pain management    Preanesthetic Checklist: 2 patient identifers, IV checked, risks and benefits discussed, monitors and equipment checked, pre-op evaluation, timeout performed, anesthesia consent, sterile technique used, no prohibitive neurological deficits and no local skin infection at insertion site      Procedure Details    Patient Position:  Supine  Prep: ChloraPrep    Monitoring:  Cardiac monitor, continuous pulse ox, blood pressure cuff and heart rate  Block Type:  Adductor canal  Laterality:  Left  Injection Technique:  Single-shot    Needle    Needle Type:  Short-bevel and echogenic  Needle Localization:  Ultrasound guidance  Reason for Ultrasound Use: appropriate spread of the medication was noted in real time and no ultrasound evidence of intravascular and/or intraneural injection            Assessment    Injection Assessment:  Good spread noted, negative resistance, negative aspiration for heme, incremental injection and low pressure  Heart Rate Change: No    - Patient tolerated block procedure well without evidence of immediate block related complications.     Medications  3/31/2025 7:21 AM      Additional Comments

## 2025-03-31 NOTE — CM/SW NOTE
03/31/25 0900   CM/SW Referral Data   Referral Source Social Work (self-referral)   Reason for Referral Discharge planning   Informant EMR;Clinical Staff Member   Discharge Needs   Anticipated D/C needs Home health care       Patient is a 68 y/o man being admitted today s/p TKR.  Pt with pre-[facility]  Referral sent to [facility] via [facility].  Await confirmation of acceptance and post op therapy evals for further DC planning. / to remain available for support and/or discharge planning.     Rowan Stanley, [facility]  Discharge Planner  [phone]

## 2025-03-31 NOTE — INTERVAL H&P NOTE
Pre-op Diagnosis: PRIMARY OSTEOATHRITIS LEFT KNEE    The above referenced H&P was reviewed by Bora Rodriguez MD on 3/31/2025, the patient was examined and no significant changes have occurred in the patient's condition since the H&P was performed.  I discussed with the patient and/or legal representative the potential benefits, risks and side effects of this procedure; the likelihood of the patient achieving goals; and potential problems that might occur during recuperation.  I discussed reasonable alternatives to the procedure, including risks, benefits and side effects related to the alternatives and risks related to not receiving this procedure.  We will proceed with procedure as planned.

## 2025-03-31 NOTE — CONSULTS
[facility]   part of [facility]    History & Physical    Russ Wheeler Jr. Patient Status:  Outpatient in a Bed    1955 MRN [id number]   Location [facility] 3SW-A Attending Bora Rodriguez MD   Hosp Day # 0 PCP Buster Pierre MD     Date:  3/31/2025  Date of Admission:  3/31/2025    Chief Complaint:     OA, s/p Left TKA    HPI:   Russ Wheeler Jr. 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 Rodriguez.     Doing well post op, denies fevers, chills, chest pains, dyspnea, orthopnea, palps, wife at bedside, pain is well controlled.     History     Past Medical History:    Arrhythmia    a fib    Diverticulosis of large intestine        High blood pressure    High cholesterol    HTN, goal below 140/90    Hyperlipidemia LDL goal < 130    Obesity (BMI 30-39.9)    Obesity, unspecified    Osteoarthritis    left knee    Other and unspecified hyperlipidemia    Paroxysmal atrial fibrillation (HCC)    Sickle cell trait    Thrombopenia    Unspecified essential hypertension    Unspecified sleep apnea    uses CPAP machine    Visual impairment    glasses    Vitamin D deficiency    Vitamin D deficiency     Past Surgical History:   Procedure Laterality Date    Colonoscopy  2018    diverticulosis, multiple polyps (7 adenomas from 3-10 mm in ascending and transverse colon). repeat 3 years    Colonoscopy  2018    diverticulosis, multiple polyps (7 adenomas from 3-10 mm in ascending and transverse colon). repeat 3 yrs    Colonoscopy      Other surgical history      DENTAL    Other surgical history      EXPLORATORY LAP. AT 8 Y/O.    Other surgical history      L LEG SKIN GRAFT     Family History   Problem Relation Age of Onset    Heart Disorder Father         CAD AT 69    Diabetes Father     Psychiatric Father 80        1)FORGETFUL  2)GRIEF REACTION  AFTER WIFE'S DEATH    Neurological Disorder Father         MILD PARKINSON'S    Other (Other) Father     Hypertension Mother     Other (Other) Mother         OSTEOPORSIS    Hypertension Son     Alcohol and Other Disorders Associated Son         1) ETOHIC 2) NARCOTIC ADDICT    Obesity Son     Stroke Maternal Grandmother     Other (Other) Maternal Grandmother         CVA 70'S    Obesity Son     Hypertension Son         ?    Obesity Sister     Other (AYDEE ON CPAP) Sister     Cancer Other         NONE     Social History:  Social History     Socioeconomic History    Marital status:      Spouse name: WIDOWERED (SHANTAL SOLIS    Number of children: 3    Years of education: MASTERS   Occupational History    Occupation: RETIRED    Occupation: VOLUNTEER   Tobacco Use    Smoking status: Never    Smokeless tobacco: Never   Vaping Use    Vaping status: Never Used   Substance and Sexual Activity    Alcohol use: Yes     Alcohol/week: 0.0 standard drinks of alcohol     Comment: ABOUT 3-5  DRINKS/WK. DENIES BINGE since ABOUT 2000.     Drug use: No     Comment: NO MARIJUANA SINCE YOUTH.    Sexual activity: Not Currently     Partners: Female   Other Topics Concern     Service No    Blood Transfusions No    Caffeine Concern No     Comment: 2-3 CUPS COFFEE/D     Weight Concern Yes    Special Diet Yes    Exercise Yes     Comment: MILD    Bike Helmet Yes    Seat Belt Yes   Social History Narrative    LIVES ALONE IN [place].     Social Drivers of Health     Food Insecurity: No Food Insecurity (3/31/2025)    NCSS - Food Insecurity     Worried About Running Out of Food in the Last Year: No     Ran Out of Food in the Last Year: No   Transportation Needs: No Transportation Needs (3/31/2025)    NCSS - Transportation     Lack of Transportation: No   Housing Stability: Not At Risk (3/31/2025)    NCSS - Housing/Utilities     Has Housing: Yes     Worried About Losing Housing: No     Unable to Get Utilities: No        Allergies/Medications:   Allergies: Allergies[1]  Medications Prior to Admission   Medication Sig    hydroCHLOROthiazide 50 MG Oral Tab Take 1 tablet (50 mg total) by mouth daily.    Bioflavonoid Products (BIOFLEX) Oral Tab Take 1 tablet by mouth daily.    pravastatin 20 MG Oral Tab Take 1 tablet (20 mg total) by mouth nightly.    apixaban (ELIQUIS) 5 MG Oral Tab Take 1 tablet (5 mg total) by mouth 2 (two) times daily.    carvedilol 25 MG Oral Tab Take 1 tablet (25 mg total) by mouth 2 (two) times daily.    Flecainide Acetate 100 MG Oral Tab Take 1 tablet (100 mg total) by mouth 2 (two) times daily.    melatonin 5 MG Oral Cap Take 1 capsule (5 mg total) by mouth nightly as needed.    Tadalafil 20 MG Oral Tab Take 1 tablet (20 mg total) by mouth daily as needed for Erectile Dysfunction.    oxyCODONE 5 MG Oral Tab Take 0.5-1 tablets (2.5-5 mg total) by mouth every 4 (four) hours as needed for Pain. Post op    traMADol 50 MG Oral Tab Take 1 tablet (50 mg total) by mouth every 4 to 6 hours as needed for Pain. Post op    celecoxib 200 MG Oral Cap Take 1 capsule (200 mg total) by mouth daily. Post op    pregabalin 75 MG Oral Cap Take 1 capsule (75 mg total) by mouth daily. Post op    cephALEXin 500 MG Oral Cap Take 1 capsule (500 mg total) by mouth 2 (two) times daily. Post op for 10 days.    sennosides-docusate 8.6-50 MG Oral Tab Take 1 tablet by mouth daily. Post op    apixaban 2.5 MG Oral Tab Take 1 tablet (2.5 mg total) by mouth 2 (two) times daily. Take 1 tablet (2.5 mg total) by mouth 2 (two) times daily for 5 days. And then resume preop dose of eliquies.       Review of Systems:   A comprehensive 12 point review of systems was otherwise negative, aside from what's stated above.    Physical Exam:   Vital Signs:  Blood pressure (!) 140/97, pulse (!) 46, temperature 98 °F (36.7 °C), temperature source Oral, resp. rate 20, height 6' 5\" (1.956 m), weight 294 lb (133.4 kg), SpO2 98%.     General: No acute distress.  Alert and oriented x 3.  HEENT: Moist mucous membranes. EOM-I. PERRL  Neck: No lymphadenopathy.  No JVD. No carotid bruits.  Respiratory: Clear to auscultation bilaterally.  No wheezes. No rhonchi.  Cardiovascular: S1, S2.  Regular rate and rhythm.  No murmurs. Equal pulses   Abdomen: Soft, nontender, nondistended.  Positive bowel sounds. No rebound tenderness  Neurologic: No focal neurological deficits.  Musculoskeletal: left leg is wrapped   Integument: No lesions. No erythema.  Psychiatric: Appropriate mood and affect.    Results:     Lab Results   Component Value Date    WBC 5.3 03/24/2025    HGB 16.0 03/24/2025    HCT 50.7 03/24/2025    PLT 84.0 (L) 03/24/2025    CREATSERUM 1.21 03/24/2025    BUN 14 03/24/2025     03/24/2025    K 3.8 03/24/2025     03/24/2025    CO2 31.0 03/24/2025     (H) 03/24/2025    CA 9.5 03/24/2025    ALB 3.8 07/16/2019    ALKPHO 47 07/16/2019    BILT 0.6 07/16/2019    TP 7.9 07/16/2019    AST 22 07/16/2019    ALT 22 07/14/2021    TSH 0.961 07/14/2021    PSA 0.853 06/26/2020    MG 2.0 07/16/2019    TROP <0.045 07/16/2019            XR KNEE (1 OR 2 VIEWS), LEFT (CPT=73560)    Result Date: 3/31/2025  CONCLUSION:  Expected postoperative changes of left knee arthroplasty.    LOCATION:  [facility]   Dictated by (CST): Sohail Rosas MD on 3/31/2025 at 11:52 AM     Finalized by (CST): Sohail Rosas MD on 3/31/2025 at 11:52 AM            Assessment/Plan:     Russ Wheeler . 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 Rodriguez.     OA s/p left TKA w/ Dr. Rodriguez 03/31/25   Post op pain  - multimodal approach to pain mgmt ordered by ortho - scheduled tylenol, iv toradol, prn oxy/ultram, lyrica   - bowel regimen   - pt/ot   - reduced dose eliquis for dvt ppx - resume full dose when okay w/ ortho   - decadron     Afib   NSVT  - on reduced  dose doac - restart eliquis 5mg bid when okay w/ ortho  - cont home coreg w/ home parameters  - cont home flecainide     eHTN  HLD  - cont home meds w/ hold parameters     Ckd3  - monitor renal function - use nsaids w/ caution     Obesity bmi 34  - has lost weight, ongoing weight loss endeavors per pcp     Hemoglobinopathy  - hgb stable, monitor       DVT ppx: see above   Code Status: full    Dispo: possible dc tomorrow       Murtaza Dexter, DO  [facility]                 [1] No Known Allergies

## 2025-03-31 NOTE — ANESTHESIA POSTPROCEDURE EVALUATION
[facility]    Russ Wheeler . Patient Status:  Outpatient in a Bed   Age/Gender 69 year old male MRN [id number]   Location [facility] SURGERY Attending Bora Rodriguez MD   Hosp Day # 0 PCP Buster Pierre MD       Anesthesia Post-op Note    LEFT TOTAL KNEE ARTHROPLASTY    Procedure Summary       Date: 03/31/25 Room / Location:  MAIN OR 11 / [facility] MAIN OR    Anesthesia Start: 0704 Anesthesia Stop: 0941    Procedure: LEFT TOTAL KNEE ARTHROPLASTY (Left: Knee) Diagnosis: (PRIMARY OSTEOATHRITIS LEFT KNEE)    Surgeons: Bora Rodriguez MD Anesthesiologist: Deangelo Boles MD    Anesthesia Type: general, MAC, spinal ASA Status: 3            Anesthesia Type: general, MAC, spinal    Vitals Value Taken Time   /84 03/31/25 0942   Temp 97.8 °F (36.6 °C) 03/31/25 0942   Pulse 50 03/31/25 0942   Resp 14 03/31/25 0942   SpO2 100 % 03/31/25 0942           Patient Location: PACU    Anesthesia Type: spinal    Airway Patency: patent    Postop Pain Control: adequate    Mental Status: mildly sedated but able to meaningfully participate in the post-anesthesia evaluation    Nausea/Vomiting: none    Cardiopulmonary/Hydration status: stable euvolemic    Complications: no apparent anesthesia related complications    Postop vital signs: stable    Dental Exam: Unchanged from Preop    Patient to be discharged from PACU when criteria met.

## 2025-03-31 NOTE — DISCHARGE INSTRUCTIONS
Sometimes managing your health at home requires assistance.  The [facility] team has recognized your preference to use [facility].  They can be reached by phone at [phone].  The fax number for your reference is [phone].. A representative from the home health agency will contact you or your family to schedule your first visit.    Knee Replacement Discharge Instructions    Dear Patient,     [facility] cares about your progress with recovery following your joint replacement surgery.     300 days from your scheduled surgery, [facility] will send you a follow-up survey to help us understand how your surgery impacted your mobility, pain, and overall quality of life. Please make every effort to complete this survey. The information collected from this survey will be used by your physician to track your recovery.     Sincerely,     [facility] [place]      Activity    Bathing  No tub baths, pools, or saunas until cleared by surgeon (about 4-6 weeks because it takes that long for the incision on the skin to heal and be a barrier to prevent infection).  When allowed to shower:    IF AQUACEL - dressing is waterproof and does not require being covered to keep it dry.  Pat dressing and surrounding skin dry after shower              AQUACEL          Gauze dressings are NOT waterproof and REQUIRE being covered with a waterproof barrier to keep the dressing and the incision dry.  SARAN WRAP, GLAD WRAP, and PRESS N SEAL WORK  REALLY WELL BUT ANY PLASTIC WRAP WILL DO.   Do not wash incision.   Remove entire wrapping and old dressing (if Gauze) after showering. Pat dry if necessary WITH A CLEAN TOWEL and cover incision with dry sterile gauze and paper tape. For other types of dressings, follow surgeon’s orders.                                 GAUZE          Driving  Do not drive until cleared by surgeon. This is usually in four to six weeks after surgery.  Discuss at follow-up office visit.   Not allowed while taking narcotic pain medication or muscle relaxants.    Sex  Usually allowed after four to six weeks - check with surgeon at your office visit.    Return to work  Usually allowed after four to six weeks. Discuss specific work activities with your surgeon.    Restrictions  For knee replacement surgery, follow instructions provided by physical therapy.  Do NOT put a pillow under your knee as it may be more difficult to straighten afterwards.    No smoking  Avoid smoking. It is known to cause breathing problems and can decrease the rate of healing.    Incision care/Dressing changes  Wash hands before and after dressing changes.  FOR DRY GAUZE DRESSING:  Change dressing daily using dry sterile gauze and paper tape once Aquacel (waterproof) dressing changed (which is about 7 days after surgery). Continue this until you follow up in the office with your surgeon. Have knee bent when changing dressing for more ease of bending afterwards.  There could be a small amount of redness around the staples or incision; this is normal.  Watch for increased redness, warmth, any odor, increased drainage or opening of the incision. A little clear yellow or blood tinged drainage is normal up to 2 weeks after surgery but it should be less every day until it stops.  Call physician if you notice any concerning changes.  Sutures/staples will be removed at first office visit (10 days- 3 weeks).                          GAUZE                                                   Medication  Anticoagulants = blood thinners (Xarelto, Eliquis, Lovenox, Coumadin, or Aspirin)  Pill or shot form depending on what your physician orders.   IF placed on Coumadin, you may also need lab work done for monitoring.  You will bleed easier and bruise easier while on these medications.   Usually you will be on a blood thinner for about 2-5 weeks depending what physician orders.  Contact your physician if you  have signs of bruising, nose bleeds or blood in your urine. You may consider using electric razors and soft bristle toothbrushes.  Do not take aspirin while taking blood thinners unless ordered by your physician.  Review anticoagulant education information sheet provided.    Discomfort  Surgical discomfort is normal for one to two months.  Have realistic goals and keep a positive outlook.  You may need pain medication regularly (every 4-6 hours) the first 2 weeks and then begin to decrease how often you are taking it.  Take pain medication as prescribed with food, especially before therapy, allowing 30-60 minutes to take effect.  Do not drink alcohol while on pain medication.  Keep pain manageable; pain should not disrupt your sleep or activities like getting out of bed or walking.  As you have less discomfort, decrease the amount of pain medication you take. Use plain Tylenol (acetaminophen) for less severe pain.  Some pain medications have Tylenol (acetaminophen) in them such as Norco and Percocet. Do NOT take Tylenol (acetaminophen) within 4 hours of a dose of these medications.  Apply ice or cold therapy to surgical site for 20 minutes at least four times a day, especially after therapy. Be sure there is a thin cloth barrier between skin and ice or cold therapy.  Change position at least every 45 minutes while awake to avoid stiffness or increased discomfort.  For knee replacements- may elevate your leg by placing a pillow under entire leg. Do not place pillow only under the knee.  Have realistic goals and keep a positive outlook.  Deep breathing and relaxation techniques and distractions can help!  If you focus on something else, you do not experience the pain the same. Take advantage of everything available to your to help control you discomfort.  Contact physician if discomfort does not respond to pain medication.    Body changes  Constipation is common with the use of narcotics.  Eat fiber rich foods and  drink plenty of fluids.  Use stool softeners such as Colace or Senakot while on narcotics, and laxatives such as Miralax or Milk of Magnesia if needed.   An enema or suppository may be needed if above measures do not work.    Prevention of infection and promotion of healing  Good hand washing is important. Everyone should wash their hands or use hand  as soon as they walk in your house-whether they live there or are visiting.  Keep bed linen/clothing freshly laundered.  Do not allow others or pets to touch your incision.  Avoid people that have colds or the flu.  Your surgeon may recommend that you take antibiotics before you undergo any dental or other invasive surgical procedures after your joint replacement. Speak with your physician about this at your post-op office visit.  Eat a balanced diet high in fiber and drink plenty of fluids.   Continue using incentive spirometry because narcotics make you sleepy so you may not take good deep breaths. We do not want you to get pneumonia.     Post op Office visits  Schedule 10 days to 3 weeks after surgery WITH SURGEON’s office.  Additional visits may need to be scheduled. Your physician will discuss this at first post-op office visit.  Schedule outpatient physical therapy per your surgeon’s orders.  Schedule one week follow up after surgery WITH PRIMARY CARE PHYSICIAN; review your medications over last 6 months. Your body gets stressed by surgery and that stress can affect all your other health issues (such as high blood pressure, diabetes, CHF, afib, and asthma just to name a few).  We don’t want those other health issues to cause you to get readmitted to the hospital; much better for you to catch developing problems and prevent them from becoming larger ones.  LOBO HOSE - IF ordered by your surgeon, wear these during the day and off at night.      Notify your surgeon if you notice any  of the following signs  Separation of incision line.  Increased redness,  swelling, or warmth of skin around incision.  Increased or foul smelling drainage from incision  Red streaks on skin near incision.  Temperature >101 F.  Increased pain at incision not relieved by pain medication.      Signs of blood clot  Pain, excessive tenderness, redness, or swelling in leg or calf (other than incision site).  (CALL SURGEON)      Go directly to the ER or CALL 911 if  you:  become short of breath  have chest pain  cough up blood  have unexplained anxiety with breathing  Traveling and Handicapped parking  Check with you surgeon when allowed so you don’t set yourself up for greater chance of complications.  If traveling by car, get out to stretch every 2 hours.  This helps prevent stiffness.  You may need to do this any time you travel for the first year after surgery.  If traveling by plane, BEFORE you get into a security line, let them know that you had your hip replaced, as you will most likely set off the metal detector. The doctors no longer provide an identification card for this as they are easily copied. ALSO request a wheelchair the first year to board and get off a plane…this aids in priority seating and you should sit on the aisle or at the bulkhead where you can easily stretch your legs and get up to walk up and down the aisles…this helps prevent blood clots and stiffness.  TEMPORARY HANDICAP PARKING APPLICATION  (good for 3-6 months)  - At Surgeon or PCP visit, request they fill out the form, then go to [employer] (only time you do not wait in a long line there). Some [facility] offices provide the same service. ([place] [employer] and [place] have this service; if you live in another [facility], you may check with them as well). You need space to open car doors to position yourself properly with walker to get in and out of your car safely; some parking spaces are  practically on top of each other and do not give you enough room.    Spanda- knee replacement (single layer compression  sleeve):  All patients should wear the compression sleeves (SPANDA-) until first follow up visit to surgeon’s office ( about 2-3 weeks) and then check with surgeon if need to continue use.  Take off to shower. Best to keep on as much as possible, even at night.  Wash with mild soap and water; DRIP dry overnight.    Directions to put on and off:  IT TAKES 2 PIECES OF SPANDA- (compression sleeves), (USUALLY DIFFERENT SIZES because a calf is usually smaller than a knee.)   Use the longer tube (spanda-/compression sleeve) pulled up over the calf.   This 1st piece (spanda-/compression sleeve) should be on the calf part of the leg, from just below the knee to the ball of the foot to expose the toes.   The 2nd piece (shorter compression sleeve) is pulled over and past the first sleeve onto the knee. The lower edge of the knee portion should overlap the top of the calf spanda- by a few inches. This is the only place where the 2 different pieces overlap.  The top edge of the second spanda- should be about a few inches above the knee but it should NOT be rolling down. The spanda- should be flat so that it does not roll and become too tight.  Makes sure it is NOT bunched up anywhere.   IF the spanda- feels TOO tight, then REMOVE it and call your surgeon to let them know.          While taking pain medications, take miralax twice daily until you are moving your bowels regularly, then you can take 1-2 times per day as needed.

## 2025-03-31 NOTE — OPERATIVE REPORT
OPERATIVE REPORT    Facility: [facility]  Patient name: Russ Wheeler Jr.  : 1955        Medical record: [id number]  Date of procedure: 2025  Pre-operative diagnosis: Left knee end-stage degenerative joint disease  Post-operative diagnosis: Same  Procedure performed: Left total knee arthroplasty  Implants: [employer] TKA   Femur: size 10 Persona  CR   Tibial tray: size G Persona    Patella - 38 mm   Bearing - 10 mm Medial Congruent  Surgeon: Bora Rodriguez M.D.   Assistant(s):  1. Karon Lam, MSN, FNP-BC   2. Caroline Victoria - 2nd assist  Anesthesia: Epidural/Adductor Canal Femoral Nerve Block  Estimated blood loss: 150 milliliters   Drains: none  Specimens: None  Complications: None apparent            INDICATIONS:  Russ Wheeler Jr. is a pleasant 69 year old year old who presented to my office with a long history of knee pain. The patient failed conservative therapy and we discussed surgical treatment options including total knee arthroplasty. Prior to surgery we discussed the risks, benefits, alternatives to surgery, and surgical convalescence.  Surgical consent was obtained both in the office, as well as the day of surgery. Risks of the procedure include, but are not limited to, infection, DVT, PE, damage to nerves or blood vessels at the time of surgery, bleeding and blood loss, stiffness/arthrofibrosis, and risk of loosening or failure of the components requiring revision surgery. The patient expressed understanding and wished to proceed with the surgery. An informed consent form was signed and placed on the chart prior to coming to the Operating Room.       PROCEDURE: The patient was brought to the operating room and once obtaining satisfactory anesthesia was placed in the supine position. The knee was prepped and draped in the usual sterile fashion for a total knee replacement.     A tourniquet was in the room but not applied/inflated during the procedure in an effort  to diminish quadriceps pain/dysfunction post operatively.     A surgical timeout was held verifying the site, procedure, and that pre-operative antibiotics had been given.  A longitudinal incision was over the anterior aspect of the knee exposing the extensor mechanism. An arthrotomy was performed and the patella displaced laterally. The gross pathologic findings revealed severe degenerative arthritis.    Subperiosteal dissection was continued around the proximal medial tibia. The necessary soft tissue release was performed to correct the fixed angular deformity. Care was taken to protect and preserve the medial collateral ligament.       Following this, the drill was used to open the femoral canal. After over drilling the canal, the intramedullary femoral guide was seated and the distal femur was resected at the stated valgus angle.     The extramedullary tibial cutting guide was then placed and the proximal tibia was resected at the appropriate level perpendicular to the long axis of the tibia.    The epicondylar axis and AP axis were determined.  The femoral sizing guide was set at the appropriate degree of external rotation.  The femur was then sized and the appropriate component selected. The anterior and posterior condyles were then resected with the appropriately sized guide and oscillating saw.    With the knee at 90 degrees of flexion, laminar spacers were placed between the femur and tibia. The anterior cruciate ligament was excised. The posterior cruciate ligament was left intact.  A medial and lateral meniscectomy were performed. A mixture of 30cc of 0.5% ropivicaine, 10mg Morphine, 30mg toradol, 0.5mg of epinephrine, clonidine 1mL were injected into the knee joint capsule posteriorly while the lamina spreaders were in position and at the capsulotomy incision sites prior to closure for local anesthesia.     The flexion and extension gaps were checked with the appropriate sized spacer and noted to be well  balanced. At this time, the tibial cut was checked with the spacer block and the alignment chata.    The distal end of the femur was sculptured with the notch and chamfer cutting guide using an oscillating saw. The tibial fixation hole was created with the tibial template and broach.    The synovium was excised from around the patella, after which the patella thickness was measured with calipers.  The patella was then prepared with the patellar reaming system.  The appropriate sized patellar template was seated and the three fixation holes were created with a drill.      A bone plug was then shaped and place in the opening created for the intramedullary femoral guide.    The trial components were then placed and the knee was found to have proper alignment and was stable through a full range of flexion and extension. The trial components were removed and the bony surfaces were cleaned with pulsatile lavage and an antibiotic solution. The bone was then dried and the permanent components were cemented in a sequential fashion. The tibial component was cemented first.  Set in the proper position and rotation.  The tibial component was impacted into place, it was fully seated and excess cemented was removed.  The femoral component was then cemented in place followed by the patella. Excess cement was removed.     Once the cement was hardened the tibial articular surface was implanted. The knee was then reduced and found to have good flexion, full extension with good stability and proper alignment. The patella tracked central.    A dilute (0.35%) betadine lavage was placed in the arthrotomy site to soak the components for 3 minutes prior to wound closure. The solution was made by adding 17.5 ml of 10% povidone-iodine in 500 cc of normal saline, resulting in a 0.35% dilution. This solution was then removed from the knee and the knee was copiously irrigated.    Hemostasis was obtained with electrocautery. The knee irrigated  with pulsatile lavage and antibiotic solution followed by normal saline.     The arthrotomy was closed with #1 stratafix barbed suture. The subcutaneous tissue was closed in layers with # 0 and # 2-0 Vicryl interrupted sutures. The skin was closed with 3-0 stratfix barbed sutures and dermabond. A sterile compressive dressing was applied. The patient tolerated the procedure well, without complication, and was transferred to the recovery room in a stable condition. The sponge and instrument count was correct.      A surgical first assistant was required and necessary for the completion of this case to aid in manipulation and positioning of the extremity while the surgeon operated.  Their assistance was vital to the safety and success of the procedure.     Deep venous thrombosis prophylaxis will consist of eliquis twice daily.            ____________________________  Bora Rodriguez M.D.

## 2025-04-01 VITALS
BODY MASS INDEX: 34.71 KG/M2 | DIASTOLIC BLOOD PRESSURE: 88 MMHG | HEIGHT: 77 IN | SYSTOLIC BLOOD PRESSURE: 163 MMHG | HEART RATE: 49 BPM | OXYGEN SATURATION: 99 % | WEIGHT: 294 LBS | RESPIRATION RATE: 18 BRPM | TEMPERATURE: 98 F

## 2025-04-01 LAB
ANION GAP SERPL CALC-SCNC: 9 MMOL/L (ref 0–18)
BUN BLD-MCNC: 19 MG/DL (ref 9–23)
CALCIUM BLD-MCNC: 9.2 MG/DL (ref 8.7–10.6)
CHLORIDE SERPL-SCNC: 102 MMOL/L (ref 98–112)
CO2 SERPL-SCNC: 27 MMOL/L (ref 21–32)
CREAT BLD-MCNC: 1.24 MG/DL
EGFRCR SERPLBLD CKD-EPI 2021: 63 ML/MIN/1.73M2 (ref 60–?)
GLUCOSE BLD-MCNC: 98 MG/DL (ref 70–99)
HCT VFR BLD AUTO: 42.8 %
HGB BLD-MCNC: 13.6 G/DL
OSMOLALITY SERPL CALC.SUM OF ELEC: 288 MOSM/KG (ref 275–295)
POTASSIUM SERPL-SCNC: 3.5 MMOL/L (ref 3.5–5.1)
SODIUM SERPL-SCNC: 138 MMOL/L (ref 136–145)

## 2025-04-01 PROCEDURE — 85018 HEMOGLOBIN: CPT | Performed by: ORTHOPAEDIC SURGERY

## 2025-04-01 PROCEDURE — 94799 UNLISTED PULMONARY SVC/PX: CPT

## 2025-04-01 PROCEDURE — 97535 SELF CARE MNGMENT TRAINING: CPT

## 2025-04-01 PROCEDURE — 97112 NEUROMUSCULAR REEDUCATION: CPT

## 2025-04-01 PROCEDURE — 97530 THERAPEUTIC ACTIVITIES: CPT

## 2025-04-01 PROCEDURE — 97165 OT EVAL LOW COMPLEX 30 MIN: CPT

## 2025-04-01 PROCEDURE — 85014 HEMATOCRIT: CPT | Performed by: ORTHOPAEDIC SURGERY

## 2025-04-01 PROCEDURE — 97116 GAIT TRAINING THERAPY: CPT

## 2025-04-01 PROCEDURE — 80048 BASIC METABOLIC PNL TOTAL CA: CPT | Performed by: INTERNAL MEDICINE

## 2025-04-01 RX ORDER — POLYETHYLENE GLYCOL 3350 17 G/17G
17 POWDER, FOR SOLUTION ORAL 2 TIMES DAILY
Status: DISCONTINUED | OUTPATIENT
Start: 2025-04-01 | End: 2025-04-01

## 2025-04-01 RX ORDER — POLYETHYLENE GLYCOL 3350 17 G/17G
17 POWDER, FOR SOLUTION ORAL 2 TIMES DAILY
Qty: 30 EACH | Refills: 0 | Status: SHIPPED | OUTPATIENT
Start: 2025-04-01

## 2025-04-01 NOTE — DISCHARGE SUMMARY
[facility]   [facility]    Discharge Summary    Russ Wheeler Jr. Patient Status:  Outpatient in a Bed    1955 MRN [id number]   Location [facility] 3SW-A Attending Bora Rodriguez MD   Hosp Day # 0 PCP Buster Pierre MD     Date of Admission: 3/31/2025   Date of Discharge: 2025    Admitting Diagnosis: PRIMARY OSTEOATHRITIS LEFT KNEE    Disposition: Home and Home with Home Health Care    Discharge Diagnosis: .Active Problems:    * No active hospital problems. *      Hospital Course:     Russ Wheeler Jr. 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 Rodriguez.     Uncomplicated post operative course     Ortho rec okay for dc home, he will dc home w/ home therapies.        OA s/p left TKA w/ Dr. Rodriguez 25   Post op pain  - multimodal approach to pain mgmt ordered by ortho - -   - pt/ot at home  - reduced dose eliquis for dvt ppx - resume full dose when okay w/ ortho (in 5 days)      Afib   NSVT  - on reduced dose doac - restart eliquis 5mg bid when okay w/ ortho  - cont home coreg w/ home parameters  - cont home flecainide      eHTN  HLD  - cont home meds       Ckd3  - monitor renal function - stable   - monitor renal function while on nsaids - f/u w/ pcp      Obesity bmi 34  - has lost weight, ongoing weight loss endeavors per pcp      Hemoglobinopathy  - hgb stable, monitor       Consultants         Provider   Role Specialty     Tee Castanon MD      Consulting Physician HOSPITALIST          Surgical Procedures       Case IDs Date Procedure Surgeon Location Status    [id number] 3/31/25 LEFT TOTAL KNEE ARTHROPLASTY Bora Rodriguez MD [facility]          Pending Labs       Order Current Status    Specimen to Pathology Tissue In process            Discharge Physical Exam:  Vital Signs:  Blood pressure (!) 163/88,  pulse (!) 49, temperature 97.8 °F (36.6 °C), temperature source Oral, resp. rate 18, height 6' 5\" (1.956 m), weight 294 lb (133.4 kg), SpO2 99%.     General: No acute distress. Alert and oriented x 3.  HEENT: Moist mucous membranes. EOM-I. PERRL  Neck: No lymphadenopathy.  No JVD. No carotid bruits.  Respiratory: Clear to auscultation bilaterally.  No wheezes. No rhonchi.  Cardiovascular: S1, S2.  Regular rate and rhythm.  No murmurs. Equal pulses   Abdomen: Soft, nontender, nondistended.  Positive bowel sounds. No rebound tenderness  Neurologic: No focal neurological deficits.  Musculoskeletal:left leg wrapped, distal n/v intact  Integument: No lesions. No erythema.  Psychiatric: Appropriate mood and affect.    Discharge Plan:   Discharge Condition: Good    Current Discharge Medication List        New Orders    Details   polyethylene glycol, PEG 3350, 17 g Oral Powd Pack Take 17 g by mouth in the morning and 17 g before bedtime.           Home Meds - Unchanged    Details   hydroCHLOROthiazide 50 MG Oral Tab Take 1 tablet (50 mg total) by mouth daily.      Bioflavonoid Products (BIOFLEX) Oral Tab Take 1 tablet by mouth daily.      pravastatin 20 MG Oral Tab Take 1 tablet (20 mg total) by mouth nightly.      !! apixaban (ELIQUIS) 5 MG Oral Tab Take 1 tablet (5 mg total) by mouth 2 (two) times daily.      carvedilol 25 MG Oral Tab Take 1 tablet (25 mg total) by mouth 2 (two) times daily.      Flecainide Acetate 100 MG Oral Tab Take 1 tablet (100 mg total) by mouth 2 (two) times daily.      melatonin 5 MG Oral Cap Take 1 capsule (5 mg total) by mouth nightly as needed.      Tadalafil 20 MG Oral Tab Take 1 tablet (20 mg total) by mouth daily as needed for Erectile Dysfunction.      oxyCODONE 5 MG Oral Tab Take 0.5-1 tablets (2.5-5 mg total) by mouth every 4 (four) hours as needed for Pain. Post op      traMADol 50 MG Oral Tab Take 1 tablet (50 mg total) by mouth every 4 to 6 hours as needed for Pain. Post op       celecoxib 200 MG Oral Cap Take 1 capsule (200 mg total) by mouth daily. Post op      pregabalin 75 MG Oral Cap Take 1 capsule (75 mg total) by mouth daily. Post op      cephALEXin 500 MG Oral Cap Take 1 capsule (500 mg total) by mouth 2 (two) times daily. Post op for 10 days.      sennosides-docusate 8.6-50 MG Oral Tab Take 1 tablet by mouth daily. Post op      !! apixaban 2.5 MG Oral Tab Take 1 tablet (2.5 mg total) by mouth 2 (two) times daily. Take 1 tablet (2.5 mg total) by mouth 2 (two) times daily for 5 days. And then resume preop dose of eliquies.       !! - Potential duplicate medications found. Please discuss with provider.                Follow up:      Follow-up Information       Bora Rodriguez MD Follow up in 2 week(s).    Specialty: SURGERY, ORTHOPEDIC  Contact information:  [address]  [address]  [facility] [address] [phone]               Buster Pierre MD Follow up in 1 week(s).    Specialty: Family Practice  Contact information:  [address]  [address]  [facility] [address] [phone]                             Follow up Labs:          Other Discharge Instructions:         Sometimes managing your health at home requires assistance.  The [facility] team has recognized your preference to use [facility].  They can be reached by phone at [phone].  The fax number for your reference is [phone].. A representative from the home health agency will contact you or your family to schedule your first visit.    Knee Replacement Discharge Instructions    Dear Patient,     [facility] cares about your progress with recovery following your joint replacement surgery.     300 days from your scheduled surgery, [facility] will send you a follow-up survey to help us understand how your surgery impacted your mobility, pain, and overall quality of life. Please make every effort to complete this survey. The information collected from this survey  will be used by your physician to track your recovery.     Sincerely,     [facility]      Activity    Bathing  No tub baths, pools, or saunas until cleared by surgeon (about 4-6 weeks because it takes that long for the incision on the skin to heal and be a barrier to prevent infection).  When allowed to shower:    IF AQUACEL - dressing is waterproof and does not require being covered to keep it dry.  Pat dressing and surrounding skin dry after shower              AQUACEL          Gauze dressings are NOT waterproof and REQUIRE being covered with a waterproof barrier to keep the dressing and the incision dry.  SARAN WRAP, GLAD WRAP, and PRESS N SEAL WORK  REALLY WELL BUT ANY PLASTIC WRAP WILL DO.   Do not wash incision.   Remove entire wrapping and old dressing (if Gauze) after showering. Pat dry if necessary WITH A CLEAN TOWEL and cover incision with dry sterile gauze and paper tape. For other types of dressings, follow surgeon’s orders.                                 GAUZE          Driving  Do not drive until cleared by surgeon. This is usually in four to six weeks after surgery. Discuss at follow-up office visit.   Not allowed while taking narcotic pain medication or muscle relaxants.    Sex  Usually allowed after four to six weeks - check with surgeon at your office visit.    Return to work  Usually allowed after four to six weeks. Discuss specific work activities with your surgeon.    Restrictions  For knee replacement surgery, follow instructions provided by physical therapy.  Do NOT put a pillow under your knee as it may be more difficult to straighten afterwards.    No smoking  Avoid smoking. It is known to cause breathing problems and can decrease the rate of healing.    Incision care/Dressing changes  Wash hands before and after dressing changes.  FOR DRY GAUZE DRESSING:  Change dressing daily using dry sterile gauze and paper tape once Aquacel (waterproof) dressing  changed (which is about 7 days after surgery). Continue this until you follow up in the office with your surgeon. Have knee bent when changing dressing for more ease of bending afterwards.  There could be a small amount of redness around the staples or incision; this is normal.  Watch for increased redness, warmth, any odor, increased drainage or opening of the incision. A little clear yellow or blood tinged drainage is normal up to 2 weeks after surgery but it should be less every day until it stops.  Call physician if you notice any concerning changes.  Sutures/staples will be removed at first office visit (10 days- 3 weeks).                          GAUZE                                                   Medication  Anticoagulants = blood thinners (Xarelto, Eliquis, Lovenox, Coumadin, or Aspirin)  Pill or shot form depending on what your physician orders.   IF placed on Coumadin, you may also need lab work done for monitoring.  You will bleed easier and bruise easier while on these medications.   Usually you will be on a blood thinner for about 2-5 weeks depending what physician orders.  Contact your physician if you have signs of bruising, nose bleeds or blood in your urine. You may consider using electric razors and soft bristle toothbrushes.  Do not take aspirin while taking blood thinners unless ordered by your physician.  Review anticoagulant education information sheet provided.    Discomfort  Surgical discomfort is normal for one to two months.  Have realistic goals and keep a positive outlook.  You may need pain medication regularly (every 4-6 hours) the first 2 weeks and then begin to decrease how often you are taking it.  Take pain medication as prescribed with food, especially before therapy, allowing 30-60 minutes to take effect.  Do not drink alcohol while on pain medication.  Keep pain manageable; pain should not disrupt your sleep or activities like getting out of bed or walking.  As you have less  discomfort, decrease the amount of pain medication you take. Use plain Tylenol (acetaminophen) for less severe pain.  Some pain medications have Tylenol (acetaminophen) in them such as Norco and Percocet. Do NOT take Tylenol (acetaminophen) within 4 hours of a dose of these medications.  Apply ice or cold therapy to surgical site for 20 minutes at least four times a day, especially after therapy. Be sure there is a thin cloth barrier between skin and ice or cold therapy.  Change position at least every 45 minutes while awake to avoid stiffness or increased discomfort.  For knee replacements- may elevate your leg by placing a pillow under entire leg. Do not place pillow only under the knee.  Have realistic goals and keep a positive outlook.  Deep breathing and relaxation techniques and distractions can help!  If you focus on something else, you do not experience the pain the same. Take advantage of everything available to your to help control you discomfort.  Contact physician if discomfort does not respond to pain medication.    Body changes  Constipation is common with the use of narcotics.  Eat fiber rich foods and drink plenty of fluids.  Use stool softeners such as Colace or Senakot while on narcotics, and laxatives such as Miralax or Milk of Magnesia if needed.   An enema or suppository may be needed if above measures do not work.    Prevention of infection and promotion of healing  Good hand washing is important. Everyone should wash their hands or use hand  as soon as they walk in your house-whether they live there or are visiting.  Keep bed linen/clothing freshly laundered.  Do not allow others or pets to touch your incision.  Avoid people that have colds or the flu.  Your surgeon may recommend that you take antibiotics before you undergo any dental or other invasive surgical procedures after your joint replacement. Speak with your physician about this at your post-op office visit.  Eat a balanced  diet high in fiber and drink plenty of fluids.   Continue using incentive spirometry because narcotics make you sleepy so you may not take good deep breaths. We do not want you to get pneumonia.     Post op Office visits  Schedule 10 days to 3 weeks after surgery WITH SURGEON’s office.  Additional visits may need to be scheduled. Your physician will discuss this at first post-op office visit.  Schedule outpatient physical therapy per your surgeon’s orders.  Schedule one week follow up after surgery WITH PRIMARY CARE PHYSICIAN; review your medications over last 6 months. Your body gets stressed by surgery and that stress can affect all your other health issues (such as high blood pressure, diabetes, CHF, afib, and asthma just to name a few).  We don’t want those other health issues to cause you to get readmitted to the hospital; much better for you to catch developing problems and prevent them from becoming larger ones.  LOBO HOSE - IF ordered by your surgeon, wear these during the day and off at night.      Notify your surgeon if you notice any  of the following signs  Separation of incision line.  Increased redness, swelling, or warmth of skin around incision.  Increased or foul smelling drainage from incision  Red streaks on skin near incision.  Temperature >101 F.  Increased pain at incision not relieved by pain medication.      Signs of blood clot  Pain, excessive tenderness, redness, or swelling in leg or calf (other than incision site).  (CALL SURGEON)      Go directly to the ER or CALL 911 if  you:  become short of breath  have chest pain  cough up blood  have unexplained anxiety with breathing  Traveling and Handicapped parking  Check with you surgeon when allowed so you don’t set yourself up for greater chance of complications.  If traveling by car, get out to stretch every 2 hours.  This helps prevent stiffness.  You may need to do this any time you travel for the first year after surgery.  If traveling by  plane, BEFORE you get into a security line, let them know that you had your hip replaced, as you will most likely set off the metal detector. The doctors no longer provide an identification card for this as they are easily copied. ALSO request a wheelchair the first year to board and get off a plane…this aids in priority seating and you should sit on the aisle or at the bulkhead where you can easily stretch your legs and get up to walk up and down the aisles…this helps prevent blood clots and stiffness.  TEMPORARY HANDICAP PARKING APPLICATION  (good for 3-6 months)  - At Surgeon or PCP visit, request they fill out the form, then go to [facility] (only time you do not wait in a long line there). Some [facility] offices provide the same service. (Angela Stringer and [place] have this service; if you live in another [facility], you may check with them as well). You need space to open car doors to position yourself properly with walker to get in and out of your car safely; some parking spaces are  practically on top of each other and do not give you enough room.    Spanda- knee replacement (single layer compression sleeve):  All patients should wear the compression sleeves (SPANDA-) until first follow up visit to surgeon’s office ( about 2-3 weeks) and then check with surgeon if need to continue use.  Take off to shower. Best to keep on as much as possible, even at night.  Wash with mild soap and water; DRIP dry overnight.    Directions to put on and off:  IT TAKES 2 PIECES OF SPANDA- (compression sleeves), (USUALLY DIFFERENT SIZES because a calf is usually smaller than a knee.)   Use the longer tube (spanda-/compression sleeve) pulled up over the calf.   This 1st piece (spanda-/compression sleeve) should be on the calf part of the leg, from just below the knee to the ball of the foot to expose the toes.   The 2nd piece (shorter compression sleeve) is pulled over and past the first sleeve onto the knee.  The lower edge of the knee portion should overlap the top of the calf spanda- by a few inches. This is the only place where the 2 different pieces overlap.  The top edge of the second spanda- should be about a few inches above the knee but it should NOT be rolling down. The spanda- should be flat so that it does not roll and become too tight.  Makes sure it is NOT bunched up anywhere.   IF the spanda- feels TOO tight, then REMOVE it and call your surgeon to let them know.          While taking pain medications, take miralax twice daily until you are moving your bowels regularly, then you can take 1-2 times per day as needed.         Total time preparing discharge: greater than 30 minutes    DO NERISSA JoelG Hospitalist

## 2025-04-01 NOTE — CM/SW NOTE
03/31/25 0900   Discharge disposition   Expected discharge disposition Home-Health   Post Acute Care Provider   ([facility])   Discharge transportation Private car     Pt discussed in rounds and will discharge today.  Sent updates to [facility] via [facility] and message to notify them of discharge today    / to remain available for support and/or discharge planning.     Chichi Walsh MBA MSN, RN Case Manager  [phone]

## 2025-04-01 NOTE — OCCUPATIONAL THERAPY NOTE
OCCUPATIONAL THERAPY EVALUATION - INPATIENT    Room Number: 356/356-A  Evaluation Date: 4/1/2025     Type of Evaluation: Initial  Presenting Problem: s/p L TKA on 3/31    Physician Order: IP Consult to Occupational Therapy  Reason for Therapy:  ADL/IADL Dysfunction and Discharge Planning    OCCUPATIONAL THERAPY ASSESSMENT   Patient is a 69 year old male admitted on 3/31/2025 with Presenting Problem: s/p L TKA on 3/31. Co-Morbidities : Afib (doac), eHTN, HLD, thalasemia (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  Prior to admission, patient's baseline is independent.  Patient met all OT goals at supervision level.  Patient reports no further questions/concerns at this time.     Patient will benefit from discharging home.     Recommendations for nursing staff:   Transfers: supervision with RW  Toileting location: Toilet    EVALUATION SESSION:  Patient at start of session: Pt was found sitting in his chair with his life partner at the bedside.     FUNCTIONAL TRANSFER ASSESSMENT  Sit to Stand: Edge of Bed; Chair  Edge of Bed: Supervision  Chair: Supervision  Toilet Transfer: Supervision    BED MOBILITY  Supine to Sit : Supervision  Sit to Supine (OT): Supervision    BALANCE ASSESSMENT     FUNCTIONAL ADL ASSESSMENT  UB Dressing Seated: Supervision  LB Dressing Seated: Supervision  LB Dressing Standing: Supervision  Toileting Seated: Supervision  Toileting Standing: Supervision    ACTIVITY TOLERANCE:                          O2 SATURATIONS       COGNITION  Overall Cognitive Status:  WFL - within functional limits    COGNITION ASSESSMENTS     Upper Extremity:   ROM: within functional limits   Strength: is within functional limits       EDUCATION PROVIDED  Patient Education : Role of Occupational Therapy; Plan of Care; Discharge Recommendations; Weight Bear  Status; Surgical Precautions; Fall Prevention; Functional Transfer Techniques  Patient's Response to Education: Verbalized Understanding  Family/Caregiver Education : Role of Occupational Therapy; Plan of Care; Discharge Recommendations  Family/Caregiver's Response to Education: Verbalized Understanding    Equipment used: RW, gait belt   Demonstrates functional use    Therapist comments: sitting in chair>stand to RW>walk to bathroom>toilet T/F>stand to RW>walk to sit EOB>supine>sit EOB>UB/LB dressing>stand to RW>walk to sit in chair, reclinedm ice and SCDs applied     Patient End of Session: Up in chair, Needs met, Call light within reach, RN aware of session/findings, All patient questions and concerns addressed, Hospital anti-slip socks, Ice applied, SCDs in place, Alarm set, Family present    OCCUPATIONAL PROFILE    HOME SITUATION  Type of Home: [facility]  Home Layout: Two level (+ basement)  Lives With: Other (Comment) (Life partner)    Toilet and Equipment: Comfort height toilet  Shower/Tub and Equipment: Walk-in shower, Tub transfer bench  Other Equipment: Other (Comment) (RW, cane)    Occupation/Status: Retired  Hand Dominance: Right  Drives: Yes  Patient Regularly Uses: Glasses (owns RW and cane)    Prior Level of Function: Pt is typically independent and unlimited. Pt's LP will be available to assist as needed.     SUBJECTIVE  \"What is the difference between PT and OT?\"     PAIN ASSESSMENT  Ratin  Location: L knee  Management Techniques: Ice, Relaxation, Repositioning    OBJECTIVE  Precautions: Bed/chair alarm  Fall Risk: Standard fall risk    WEIGHT BEARING RESTRICTION  L Lower Extremity: Weight Bearing as Tolerated      AM-PAC ‘6-Clicks’ Inpatient Daily Activity Short Form  -   Putting on and taking off regular lower body clothing?: A Little  -   Bathing (including washing, rinsing, drying)?: A Little  -   Toileting, which includes using toilet, bedpan or urinal? : A Little  -   Putting on and taking  off regular upper body clothing?: None  -   Taking care of personal grooming such as brushing teeth?: None  -   Eating meals?: None    AM-PAC Score:  Score: 21  Approx Degree of Impairment: 32.79%  Standardized Score (AM-PAC Scale): 44.27    ADDITIONAL TESTS     NEUROLOGICAL FINDINGS      PLAN   Patient has been evaluated and presents with no skilled Occupational Therapy needs at this time.  Patient discharged from Occupational Therapy services.  Please re-order if a new functional limitation presents during this admission.    OT Device Recommendations: None    Patient Evaluation Complexity Level:   Occupational Profile/Medical History LOW - Brief history including review of medical or therapy records    Specific performance deficits impacting engagement in ADL/IADL LOW  1 - 3 performance deficits    Client Assessment/Performance Deficits LOW - No comorbidities nor modifications of tasks    Clinical Decision Making LOW - Analysis of occupational profile, problem-focused assessments, limited treatment options    Overall Complexity LOW     OT Session Time: 30 minutes  Self-Care Home Management: 15 minutes

## 2025-04-01 NOTE — PLAN OF CARE
Patient A&Ox4. VSS. RA. AYDEE, CPAP at night. Telemetry monitoring, NSB. Left knee dressing C/D/I. Mild pain to left knee, managed with current pain medications. Denies nausea/vomiting. Voiding without difficulty. Up to bathroom with SBA and walker. Fall precautions in place. Call light in reach.

## 2025-04-01 NOTE — PROGRESS NOTES
AVS reviewed, IV removed, discharge video viewed, will dc home w/ [facility], wife at bedside, both verbalized understanding of discharge instructions.

## 2025-04-01 NOTE — PROGRESS NOTES
Progress Note    Patient: Russ Wheeler Jr.  Medical record #: [id number]    Russ Wheeler Jr. is a 69 year old  male who is POD #1 left TKA.  The patient's main complaint today is surgical pain at the operative site. No numbness or tingling. No chest pain or shortness of breath.    Hospital Problem List:  Active Problems:    * No active hospital problems. *      Current Medications:   lactated ringers infusion   Intravenous Continuous    [COMPLETED] tranexamic acid in sodium chloride 0.7% (Cyklokapron) 1000 mg/100mL infusion premix 1,000 mg  1,000 mg Intravenous Once    [COMPLETED] ceFAZolin (Ancef) 3 g in sodium chloride 0.9% 100mL IVPB premix  3 g Intravenous Once    sodium chloride 0.9 % IV bolus 500 mL  500 mL Intravenous Once PRN    sennosides (Senokot) tab 17.2 mg  17.2 mg Oral Nightly    docusate sodium (Colace) cap 100 mg  100 mg Oral BID    polyethylene glycol (PEG 3350) (Miralax) 17 g oral packet 17 g  17 g Oral Daily PRN    magnesium hydroxide (Milk of Magnesia) 400 MG/5ML oral suspension 30 mL  30 mL Oral Daily PRN    bisacodyl (Dulcolax) 10 MG rectal suppository 10 mg  10 mg Rectal Daily PRN    fleet enema (Fleet) rectal enema 133 mL  1 enema Rectal Once PRN    famotidine (Pepcid) tab 20 mg  20 mg Oral BID    Or    famotidine (Pepcid) 20 mg/2mL injection 20 mg  20 mg Intravenous BID    ondansetron (Zofran) 4 MG/2ML injection 4 mg  4 mg Intravenous Q6H PRN    metoclopramide (Reglan) 5 mg/mL injection 10 mg  10 mg Intravenous Q8H PRN    [] diphenhydrAMINE (Benadryl) 50 mg/mL  injection 25 mg  25 mg Intravenous Once PRN    diphenhydrAMINE (Benadryl) cap/tab 25 mg  25 mg Oral Q4H PRN    Or    diphenhydrAMINE (Benadryl) 50 mg/mL  injection 12.5 mg  12.5 mg Intravenous Q4H PRN    ferrous sulfate DR tab 325 mg  325 mg Oral Daily with breakfast    apixaban (Eliquis) tab 2.5 mg  2.5 mg Oral BID@0600,1800    [COMPLETED] tranexamic acid in sodium chloride 0.7% (Cyklokapron) 1000 mg/100mL  infusion premix 1,000 mg  1,000 mg Intravenous Once    [COMPLETED] ceFAZolin (Ancef) 3 g in sodium chloride 0.9% 100mL IVPB premix  3 g Intravenous Q8H    [COMPLETED] acetaminophen (Tylenol) tab 650 mg  650 mg Oral Once    [COMPLETED] oxyCODONE immediate release tab 2.5 mg  2.5 mg Oral Once PRN    Or    [COMPLETED] oxyCODONE immediate release tab 5 mg  5 mg Oral Once PRN    Or    [COMPLETED] oxyCODONE immediate release tab 10 mg  10 mg Oral Once PRN    tiZANidine (Zanaflex) partial tab 1 mg  1 mg Oral Q6H PRN    dexAMETHasone PF (Decadron) 10 MG/ML injection 8 mg  8 mg Intravenous Once    acetaminophen (Tylenol) tab 650 mg  650 mg Oral Q4H While awake    ketorolac (Toradol) 30 MG/ML injection 15 mg  15 mg Intravenous Q6H    Followed by    ibuprofen (Motrin) tab 600 mg  600 mg Oral 4 times per day    traMADol (Ultram) tab 50 mg  50 mg Oral 4 times per day    oxyCODONE immediate release tab 5 mg  5 mg Oral Q4H PRN    Or    oxyCODONE immediate release tab 10 mg  10 mg Oral Q4H PRN    HYDROmorphone (Dilaudid) 1 MG/ML injection 0.4 mg  0.4 mg Intravenous Q2H PRN    Or    HYDROmorphone (Dilaudid) 1 MG/ML injection 0.8 mg  0.8 mg Intravenous Q2H PRN    carvedilol (Coreg) tab 25 mg  25 mg Oral BID    flecainide (Tambocor) tab 100 mg  100 mg Oral BID    melatonin cap/tab 5 mg  5 mg Oral Nightly    pravastatin (Pravachol) tab 20 mg  20 mg Oral Nightly    pregabalin (Lyrica) cap 75 mg  75 mg Oral Daily    [COMPLETED] povidone-iodine (Betadine) 10 % 17.5 mL in sodium chloride 0.9 % 500 mL irrigation   Irrigation Once (Intra-Op)    [COMPLETED] clonidine/epinephrine/ropivacaine/ketorolac in 0.9% NaCl 60 mL pain cocktail syringe for knee arthroplasty   Infiltration Once (Intra-Op)         Input/Output:    Intake/Output Summary (Last 24 hours) at 4/1/2025 0636  Last data filed at 4/1/2025 0410  Gross per 24 hour   Intake 3220 ml   Output 1100 ml   Net 2120 ml       Vital signs:  Blood pressure 131/73, pulse 50, temperature 97.9 °F  (36.6 °C), temperature source Oral, resp. rate 20, height 6' 5\" (1.956 m), weight 294 lb (133.4 kg), SpO2 99%.    Physical Exam:     Left Knee:  Dressing: clean and dry  Able to dorsiflex ankle and move toes  Sensation grossly intact to light touch throughout  Distal pulses intact  No calf swelling  Surekha's sign negative  Compartments soft  No signs or symptoms of DVT or compartment syndrome    Labs:   Lab Results   Component Value Date    HGB 13.6 04/01/2025    HCT 42.8 04/01/2025         Assessment: 69 year old  male POD #1 left TKA    Plan:  Hgb - 13.6 - consistent with post op changes, stable/asymptomatic  mobilize with PT, WBAT on operative knee with walker   Ree-op Glucose Goal control <140  pain controlled   Ice and elevation  TEDs, SCDS, IS  Eliquis 2.5mg BID for 5 days, then resume normal dosing for DVT Ppx   Disposition: plan discharge home POD #1 or 2 if doing well with PT; if slow to progress consider transfer to rehab   Leave Dressing in place for one week once aquacel in place.  OK to switch dressing for >50% saturated. Ok to shower with it on.    Follow up with Dr. Rodriguez - 2-3 weeks after discharge home or 2-3 weeks after discharge from rehab  Appreciate medical team's expertise and help in managing the patient's medical co-morbidities    Followed by [facility] for medical conditions to include Active Problems:    * No active hospital problems. *        Signed:  ALEXANDREA Stringer  4/1/2025  6:36 AM

## 2025-04-01 NOTE — PLAN OF CARE
Patient is alert and oriented x4. VSS on RA. Pain controlled. No complaint of numbness or tingling. Pulses intact. Plantar and dorsi flexions intact bilaterally. Motor strength intact.  Aquacel dressing. IS and ankle pumps encouraged. Belongings within reach. Encouraged to call for any needs.

## 2025-04-01 NOTE — PHYSICAL THERAPY NOTE
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      Patient is requiring supervision and contact guard assist as a result of the following impairments: decreased functional strength, decreased endurance/aerobic capacity, impaired standing  balance, and limited L knee  ROM.     Patient continues to function below baseline with transfers, gait, and stair negotiation.  Next session anticipate patient to progress gait.  Physical Therapy will continue to follow patient for duration of hospitalization.    Patient continues to benefit from continued skilled PT services: at discharge to promote prior level of function.  Anticipate patient will return home with home health PT.    PLAN DURING HOSPITALIZATION  Nursing Mobility Recommendation : 1 Assist  PT Device Recommendation: Rolling walker  PT Treatment Plan: Bed mobility, Body mechanics, Coordination, Endurance, Energy conservation, Patient education, Family education, Gait training, Neuromuscular re-educate, Range of motion, Strengthening, Stoop training, Stair training, Balance training, Transfer training  Frequency (Obs): Daily     CURRENT GOALS       Goal #1  Patient is able to demonstrate supine - sit EOB @ level: supervision      Goal #2  Patient is able to demonstrate transfers Sit to/from Stand at assistance level: supervision   Goal #3     Patient is able to ambulate 150 feet with assistive device at assistance level: supervision   Goal #4     Patient will negotiate 4 stairs/one curb w/ assistive device and supervision   Goal #5     Patient verbalizes and/or demonstrates all precautions and safety  concerns independently    Goal #6        Goal Comments: Goals established on 3/31/2025     2025 all goals adequate for d/c     SUBJECTIVE  \"I want to do as much as I can\"     OBJECTIVE  Precautions: Bed/chair alarm    WEIGHT BEARING RESTRICTION  L Lower Extremity: Weight Bearing as Tolerated    PAIN ASSESSMENT   Ratin  Location: L quad  Management Techniques: Activity promotion, Body mechanics, Breathing techniques, Relaxation, Repositioning    BALANCE                                                                                                                       Static Sitting: Good  Dynamic Sitting: Fair +           Static Standing: Fair -  Dynamic Standing: Fair -    ACTIVITY TOLERANCE                         O2 WALK       AM-PAC '6-Clicks' INPATIENT SHORT FORM - BASIC MOBILITY  How much difficulty does the patient currently have...  Patient Difficulty: Turning over in bed (including adjusting bedclothes, sheets and blankets)?: None   Patient Difficulty: Sitting down on and standing up from a chair with arms (e.g., wheelchair, bedside commode, etc.): None   Patient Difficulty: Moving from lying on back to sitting on the side of the bed?: None   How much help from another person does the patient currently need...   Help from Another: Moving to and from a bed to a chair (including a wheelchair)?: None   Help from Another: Need to walk in hospital room?: A Little   Help from Another: Climbing 3-5 steps with a railing?: A Little     AM-PAC Score:  Raw Score: 22   Approx Degree of Impairment: 20.91%   Standardized Score (AM-PAC Scale): 53.28   CMS Modifier (G-Code): CJ    FUNCTIONAL ABILITY STATUS  Gait Assessment   Functional Mobility/Gait Assessment  Gait Assistance: Contact guard assist, Supervision  Distance (ft): 250  Assistive Device: Rolling walker  Pattern: L Decreased stance time  Stairs: Stairs, Car transfer  How Many Stairs: 4x2  Device: 2 Rails, 1 Rail  Assist: Contact guard assist  Pattern:  Descend, Ascend  Ascend: Step to  Descend: Side step  Car transfer: supervision    Skilled Therapy Provided  Pt presents in chair .    Significance of achieving good ROM of L knee in a timely fashion explained. Pt performed seated and standing therex per TKA protocol. Pt gait trained c RW cues for reciprocal gait pattern, WBAT and proper integration of RW with good return demo.   Pt t/f trained as noted above c cues for sequencing.   Pt has RW for home use, therapist sized appropriately . Pt left in chair , needs met. All questions and concerns addressed.        Bed Moobility:  Rolling:    Supine<>Sit:    Sit<>Supine:      Transfer Mobility:  Sit<>Stand: CGA    Stand<>Sit: supervision    Gait: CGA to supervision     Therapist's Comments: provided HEP hand out, supportive SO present       THERAPEUTIC EXERCISES  Lower Extremity Ankle pumps  Hip AB/AD  Heel raises  Heel slides  Knee extension  Quad sets  SLR  Standing knee flexion      Upper Extremity      Position Sitting & Standing     Repetitions   10   Sets   1     Patient End of Session: Up in chair, Needs met, Call light within reach, RN aware of session/findings, All patient questions and concerns addressed, Hospital anti-slip socks, SCDs in place, Ice applied, Alarm set, Family present    PT Session Time: 40 minutes  Gait Trainin minutes  Therapeutic Activity: 20 minutes  Therapeutic Exercise:  minutes   Neuromuscular Re-education:  minutes

## 2025-08-28 ENCOUNTER — ANESTHESIA (OUTPATIENT)
Dept: ENDOSCOPY | Facility: HOSPITAL | Age: 70
End: 2025-08-28

## 2025-08-28 ENCOUNTER — HOSPITAL ENCOUNTER (OUTPATIENT)
Facility: HOSPITAL | Age: 70
Setting detail: HOSPITAL OUTPATIENT SURGERY
Discharge: HOME | End: 2025-08-28
Attending: INTERNAL MEDICINE | Admitting: INTERNAL MEDICINE

## 2025-08-28 ENCOUNTER — ANESTHESIA EVENT (OUTPATIENT)
Dept: ENDOSCOPY | Facility: HOSPITAL | Age: 70
End: 2025-08-28

## 2025-08-28 VITALS
OXYGEN SATURATION: 99 % | SYSTOLIC BLOOD PRESSURE: 126 MMHG | HEART RATE: 50 BPM | WEIGHT: 277 LBS | DIASTOLIC BLOOD PRESSURE: 72 MMHG | RESPIRATION RATE: 18 BRPM | BODY MASS INDEX: 32.71 KG/M2 | TEMPERATURE: 98 F | HEIGHT: 77 IN

## 2025-08-28 PROCEDURE — 88305 TISSUE EXAM BY PATHOLOGIST: CPT | Performed by: INTERNAL MEDICINE

## 2025-08-28 RX ORDER — SODIUM CHLORIDE, SODIUM LACTATE, POTASSIUM CHLORIDE, CALCIUM CHLORIDE 600; 310; 30; 20 MG/100ML; MG/100ML; MG/100ML; MG/100ML
INJECTION, SOLUTION INTRAVENOUS CONTINUOUS
Status: DISCONTINUED | OUTPATIENT
Start: 2025-08-28 | End: 2025-08-28

## 2025-08-28 RX ORDER — NALOXONE HYDROCHLORIDE 0.4 MG/ML
0.08 INJECTION, SOLUTION INTRAMUSCULAR; INTRAVENOUS; SUBCUTANEOUS ONCE AS NEEDED
Status: DISCONTINUED | OUTPATIENT
Start: 2025-08-28 | End: 2025-08-28

## 2025-08-28 RX ORDER — LIDOCAINE HYDROCHLORIDE 10 MG/ML
INJECTION, SOLUTION EPIDURAL; INFILTRATION; INTRACAUDAL; PERINEURAL AS NEEDED
Status: DISCONTINUED | OUTPATIENT
Start: 2025-08-28 | End: 2025-08-28 | Stop reason: SURG

## 2025-08-28 RX ADMIN — LIDOCAINE HYDROCHLORIDE 50 MG: 10 INJECTION, SOLUTION EPIDURAL; INFILTRATION; INTRACAUDAL; PERINEURAL at 13:33:00

## (undated) DEVICE — VIOLET BRAIDED (POLYGLACTIN 910), SYNTHETIC ABSORBABLE SUTURE: Brand: COATED VICRYL

## (undated) DEVICE — SHEET,DRAPE,70X100,STERILE: Brand: MEDLINE

## (undated) DEVICE — 2T11 #2 PDO 36 X 36: Brand: 2T11 #2 PDO 36 X 36

## (undated) DEVICE — GLOVE SUR 6.5 SENSICARE PI PIP GRN PWD F

## (undated) DEVICE — STRYKER PERFORMANCE SERIES SAGITTAL BLADE: Brand: STRYKER PERFORMANCE SERIES

## (undated) DEVICE — ADHESIVE SKIN TOP FOR WND CLSR DERMBND ADV

## (undated) DEVICE — HOOD: Brand: FLYTE

## (undated) DEVICE — CEMENT MIXING SYSTEM WITH FEMORAL BREAKWAY NOZZLE: Brand: REVOLUTION

## (undated) DEVICE — KIT VLV 5 PC AIR H2O SUCT BX ENDOGATOR CONN

## (undated) DEVICE — UNIVERSAL STERIBUMP® STERILE (5/CASE): Brand: UNIVERSAL STERIBUMP®

## (undated) DEVICE — NEEDLE SPNL 18GA L3.5IN PNK QNCKE STYL DISP

## (undated) DEVICE — SNARE OVL ROT 10MM  230CM CLD ONLY

## (undated) DEVICE — KIT MFLD FOR SPEC COLL

## (undated) DEVICE — DRAPE,U/SHT,SPLIT,FILM,60X84,STERILE: Brand: MEDLINE

## (undated) DEVICE — STOCKINETTE,IMPERVIOUS,12X48,STERILE: Brand: MEDLINE

## (undated) DEVICE — KIT CUSTOM ENDOPROCEDURE STERIS

## (undated) DEVICE — GLOVE SUR 7 SENSICARE PI PIP CRM PWD F

## (undated) DEVICE — COVER,LIGHT,CAMERA,HARD,1/PK,STRL: Brand: MEDLINE

## (undated) DEVICE — CANNULA NSL AD W/ FLTR 14FT O2/CO2

## (undated) DEVICE — BANDAGE,COHESIVE,TAN,4X5YD,LF,STRL: Brand: MEDLINE

## (undated) DEVICE — LAPAROTOMY SPONGE - RF AND X-RAY DETECTABLE PRE-WASHED: Brand: SITUATE

## (undated) DEVICE — APPLICATOR PREP 26ML CHG 2% ISO ALC 70%

## (undated) DEVICE — GLOVE SUR 6.5 SENSICARE PI PIP CRM PWD F

## (undated) DEVICE — CANISTER SUCT 1200CC LID BLU HRD ADPT AUTO

## (undated) DEVICE — GLOVE SUR 7.5 SENSICARE PI PIP GRN PWD F

## (undated) DEVICE — SUT STRATAFIX SPRL MCRYL+ 2-0 27IN CT-1 ABSRB

## (undated) DEVICE — MEDI-VAC NON-CONDUCTIVE SUCTION TUBING: Brand: CARDINAL HEALTH

## (undated) DEVICE — HOOD, PEEL-AWAY: Brand: FLYTE

## (undated) DEVICE — TOTAL KNEE CDS: Brand: MEDLINE INDUSTRIES, INC.

## (undated) DEVICE — WRAP COMPR UNIV KNEE HOT CLD GEL MICWV AND

## (undated) DEVICE — CONTAINER,SPECIMEN,PNEU TUBE,4OZ,OR STRL: Brand: MEDLINE

## (undated) DEVICE — BIPOLAR SEALER 23-112-1 AQM 6.0: Brand: AQUAMANTYS ®

## (undated) DEVICE — SUT MCRYL 3-0 27IN ABSRB UD 19MM PS-2 3/8

## (undated) DEVICE — SUT COAT VCRL 0 27IN CT-1 ABSRB UD 36MM 1/2

## (undated) DEVICE — GAUZE,SPONGE,USP,4"X4",12PLY,STRL,10/PK: Brand: MEDLINE INDUSTRIES, INC.

## (undated) DEVICE — ELECTRODE EKG W3.5XL4CM ABRAD W1.25XL1.5IN

## (undated) DEVICE — NEPTUNE E-SEP SMOKE EVACUATION PENCIL, COATED, 70MM BLADE, PUSH BUTTON SWITCH: Brand: NEPTUNE E-SEP

## (undated) DEVICE — Device

## (undated) DEVICE — SOLUTION IRRIG 3000ML 0.9% NACL FLX CONT

## (undated) DEVICE — ANTISEPTIC 4OZ 70% ISO ALC

## (undated) DEVICE — BLADE RMR 32MM PAT SS W/ PILOT H

## (undated) DEVICE — 3M™ IOBAN™ 2 ANTIMICROBIAL INCISE DRAPE 6648EZ: Brand: IOBAN™ 2

## (undated) DEVICE — SYRINGE MED 30ML STD CLR PLAS LL TIP N CTRL

## (undated) DEVICE — SLEEVE COMPR MD KNEE LEN SGL USE KENDALL SCD